# Patient Record
Sex: MALE | Race: WHITE | NOT HISPANIC OR LATINO | Employment: FULL TIME | URBAN - NONMETROPOLITAN AREA
[De-identification: names, ages, dates, MRNs, and addresses within clinical notes are randomized per-mention and may not be internally consistent; named-entity substitution may affect disease eponyms.]

---

## 2024-03-15 ENCOUNTER — HOSPITAL ENCOUNTER (EMERGENCY)
Facility: HOSPITAL | Age: 44
Discharge: HOME | End: 2024-03-15
Attending: EMERGENCY MEDICINE | Admitting: EMERGENCY MEDICINE
Payer: COMMERCIAL

## 2024-03-15 VITALS
WEIGHT: 315 LBS | BODY MASS INDEX: 41.75 KG/M2 | OXYGEN SATURATION: 97 % | TEMPERATURE: 97.9 F | DIASTOLIC BLOOD PRESSURE: 91 MMHG | RESPIRATION RATE: 18 BRPM | SYSTOLIC BLOOD PRESSURE: 134 MMHG | HEIGHT: 73 IN | HEART RATE: 80 BPM

## 2024-03-15 DIAGNOSIS — M54.2 NECK PAIN: Primary | ICD-10-CM

## 2024-03-15 PROCEDURE — 99283 EMERGENCY DEPT VISIT LOW MDM: CPT

## 2024-03-15 PROCEDURE — 99284 EMERGENCY DEPT VISIT MOD MDM: CPT | Performed by: EMERGENCY MEDICINE

## 2024-03-15 RX ORDER — GABAPENTIN 300 MG/1
300 CAPSULE ORAL 3 TIMES DAILY
Qty: 30 CAPSULE | Refills: 0 | Status: SHIPPED | OUTPATIENT
Start: 2024-03-15

## 2024-03-15 NOTE — ED PROVIDER NOTES
"History  Chief Complaint   Patient presents with    Head Injury     Pt c/o shooting pain from neck down spine since hitting head on low cement ceiling while walking up steps 3/2/24. Pt was evaluated at other ED 1wk ago-ct done wnl. Pt requests MRI. Pt has been taking ibuprofen and tylenol with some relief.      Patient is a 43-year-old male presenting to the emergency department complaining of continued neck pain with pain shooting up and down his spine that is been going on after he sustained an injury 13 days ago, states he was walking upstairs and encountered a low concrete ceiling, hitting the top of his head on the ceiling, he was subsequently seen at outside hospital and had CT scan of the head and neck done which showed degenerative changes in his cervical spine, he also saw his PCP and requested an MRI but was told that insurance would not likely approve the MRI, therefore he presents to the emergency department today requesting an MRI, he has been taking Tylenol and Motrin as prescribed at outside hospital but states that he is afraid he is \"masking\" his symptoms by taking this, therefore did not take any today, reports some occasional numbness and tingling down the right arm, denies any having any neck or back problems prior to his injury, he was referred to see comprehensive spine and concussion specialist at outside hospital but first available appointment is April 4, states he took a week off of work in order to rest but symptoms have not yet resolved        None       History reviewed. No pertinent past medical history.    History reviewed. No pertinent surgical history.    History reviewed. No pertinent family history.  I have reviewed and agree with the history as documented.    E-Cigarette/Vaping    E-Cigarette Use Never User      E-Cigarette/Vaping Substances     Social History     Tobacco Use    Smoking status: Never    Smokeless tobacco: Never   Vaping Use    Vaping status: Never Used   Substance " Use Topics    Alcohol use: Yes    Drug use: Never       Review of Systems   Constitutional: Negative.    HENT: Negative.     Eyes: Negative.    Respiratory: Negative.     Cardiovascular: Negative.    Gastrointestinal:  Positive for nausea.   Endocrine: Negative.    Genitourinary: Negative.    Musculoskeletal:  Positive for neck pain.   Skin: Negative.    Allergic/Immunologic: Negative.    Neurological:  Positive for light-headedness and headaches.   Hematological: Negative.    Psychiatric/Behavioral: Negative.         Physical Exam  Physical Exam  Constitutional:       Appearance: He is well-developed.   HENT:      Head: Normocephalic and atraumatic.   Eyes:      Conjunctiva/sclera: Conjunctivae normal.      Pupils: Pupils are equal, round, and reactive to light.   Neck:        Comments: Cervical paraspinal muscle tenderness, no midline tenderness or deformity  Cardiovascular:      Rate and Rhythm: Normal rate.   Pulmonary:      Effort: Pulmonary effort is normal.   Abdominal:      Palpations: Abdomen is soft.   Musculoskeletal:         General: Normal range of motion.        Arms:       Cervical back: Normal range of motion and neck supple.      Comments: Left-sided thoracic paraspinal muscle tenderness, no bony tenderness or deformity   Skin:     General: Skin is warm and dry.   Neurological:      Mental Status: He is alert and oriented to person, place, and time.         Vital Signs  ED Triage Vitals [03/15/24 1633]   Temperature Pulse Respirations Blood Pressure SpO2   97.9 °F (36.6 °C) 80 18 134/91 97 %      Temp src Heart Rate Source Patient Position - Orthostatic VS BP Location FiO2 (%)   -- Monitor Sitting Right arm --      Pain Score       5           Vitals:    03/15/24 1633   BP: 134/91   Pulse: 80   Patient Position - Orthostatic VS: Sitting         Visual Acuity      ED Medications  Medications - No data to display    Diagnostic Studies  Results Reviewed       None                   No orders to display               Procedures  Procedures         ED Course                               SBIRT 22yo+      Flowsheet Row Most Recent Value   Initial Alcohol Screen: US AUDIT-C     1. How often do you have a drink containing alcohol? 0 Filed at: 03/15/2024 1655   2. How many drinks containing alcohol do you have on a typical day you are drinking?  0 Filed at: 03/15/2024 1655   3a. Male UNDER 65: How often do you have five or more drinks on one occasion? 0 Filed at: 03/15/2024 1655   Audit-C Score 0 Filed at: 03/15/2024 1655   MARGARITO: How many times in the past year have you...    Used an illegal drug or used a prescription medication for non-medical reasons? Never Filed at: 03/15/2024 1655                      Medical Decision Making  Patient presents with headache, neck pain, pain shooting up and down his back, lightheadedness and nausea secondary to head injury sustained 13 days prior to visit today.  Outside hospital chart was reviewed, CT head and cervical spine were performed.  Given physical exam findings and history, low suspicion for intracranial hemorrhage or significant trauma, carotid or vertebral artery dissection, significant cervical spine injury, facial fracture or other significant injury.  No focal neurological findings on exam.  No persistent confusion, vomiting, vision changes, difficulty ambulating or intractable pain.  Plan to discharge home with recommendation for follow-up with comprehensive spine and pain management, refrain from strenuous exercise or exertion until symptoms completely resolved, refrain from activities that could result in further head injury.    Problems Addressed:  Neck pain: acute illness or injury    Risk  OTC drugs.  Prescription drug management.             Disposition  Final diagnoses:   Neck pain     Time reflects when diagnosis was documented in both MDM as applicable and the Disposition within this note       Time User Action Codes Description Comment    3/15/2024  4:53 PM  Coni Sanz Add [M54.2] Neck pain           ED Disposition       ED Disposition   Discharge    Condition   Stable    Date/Time   Fri Mar 15, 2024 1654    Comment   Domenic Albert discharge to home/self care.                   Follow-up Information       Follow up With Specialties Details Why Contact Info    Abelardo Francois MD Pain Medicine, Interventional Radiology In 3 days  [address] [address]  [phone]              Discharge Medication List as of 3/15/2024  4:54 PM        START taking these medications    Details   gabapentin (Neurontin) 300 mg capsule Take 1 capsule (300 mg total) by mouth 3 (three) times a day For post-herpetic neuralgia: Take 1 tablet on day 1,  Then take 2 tablets on day 2, Then take 3 tablets on day 3 and every day after that as instructed by your doctor., Starting Fri 3/15/2024,  Normal                 PDMP Review       None            ED Provider  Electronically Signed by             Coni Sanz DO  03/15/24 8870

## 2024-03-15 NOTE — Clinical Note
Domenic Albert was seen and treated in our emergency department on 3/15/2024.                Diagnosis:     Domenic  may return to work on return date.    He may return on this date: 03/19/2024         If you have any questions or concerns, please don't hesitate to call.      Coni Sanz, DO    ______________________________           _______________          _______________  Hospital Representative                              Date                                Time

## 2024-04-03 RX ORDER — LISINOPRIL 10 MG/1
10 TABLET ORAL DAILY
COMMUNITY

## 2024-04-03 RX ORDER — IBUPROFEN 600 MG/1
600 TABLET ORAL EVERY 8 HOURS PRN
COMMUNITY
Start: 2024-03-09 | End: 2025-03-09

## 2024-04-05 ENCOUNTER — CONSULT (OUTPATIENT)
Dept: PAIN MEDICINE | Facility: CLINIC | Age: 44
End: 2024-04-05
Payer: COMMERCIAL

## 2024-04-05 ENCOUNTER — HOSPITAL ENCOUNTER (OUTPATIENT)
Dept: RADIOLOGY | Facility: CLINIC | Age: 44
End: 2024-04-05
Payer: COMMERCIAL

## 2024-04-05 VITALS
HEIGHT: 73 IN | TEMPERATURE: 96.7 F | DIASTOLIC BLOOD PRESSURE: 80 MMHG | BODY MASS INDEX: 41.75 KG/M2 | SYSTOLIC BLOOD PRESSURE: 112 MMHG | OXYGEN SATURATION: 99 % | HEART RATE: 80 BPM | RESPIRATION RATE: 18 BRPM | WEIGHT: 315 LBS

## 2024-04-05 DIAGNOSIS — M54.2 NECK PAIN: ICD-10-CM

## 2024-04-05 DIAGNOSIS — M47.812 CERVICAL SPONDYLOSIS: Primary | ICD-10-CM

## 2024-04-05 DIAGNOSIS — M54.16 LUMBAR RADICULOPATHY: ICD-10-CM

## 2024-04-05 DIAGNOSIS — E11.9 TYPE 2 DIABETES MELLITUS WITHOUT COMPLICATION, WITHOUT LONG-TERM CURRENT USE OF INSULIN (HCC): ICD-10-CM

## 2024-04-05 DIAGNOSIS — M54.12 CERVICAL RADICULOPATHY: ICD-10-CM

## 2024-04-05 PROCEDURE — 99244 OFF/OP CNSLTJ NEW/EST MOD 40: CPT | Performed by: ANESTHESIOLOGY

## 2024-04-05 PROCEDURE — 72100 X-RAY EXAM L-S SPINE 2/3 VWS: CPT

## 2024-04-05 RX ORDER — PSEUDOEPHED/ACETAMINOPH/DIPHEN 30MG-500MG
TABLET ORAL
COMMUNITY
Start: 2024-03-10

## 2024-04-05 RX ORDER — GABAPENTIN 300 MG/1
300 CAPSULE ORAL 3 TIMES DAILY
Qty: 90 CAPSULE | Refills: 2 | Status: SHIPPED | OUTPATIENT
Start: 2024-04-05

## 2024-04-05 RX ORDER — ALBUTEROL SULFATE 90 UG/1
2 AEROSOL, METERED RESPIRATORY (INHALATION) EVERY 4 HOURS PRN
COMMUNITY
Start: 2024-03-11

## 2024-04-05 RX ORDER — CITALOPRAM HYDROBROMIDE 10 MG/1
10 TABLET ORAL EVERY MORNING
COMMUNITY
Start: 2024-03-11

## 2024-04-05 RX ORDER — LEVOTHYROXINE SODIUM 0.15 MG/1
TABLET ORAL
COMMUNITY
Start: 2024-03-24

## 2024-04-05 RX ORDER — CYCLOBENZAPRINE HCL 10 MG
10 TABLET ORAL 3 TIMES DAILY PRN
COMMUNITY
End: 2024-04-05

## 2024-04-05 RX ORDER — HYDROCORTISONE 25 MG/G
CREAM TOPICAL
COMMUNITY
Start: 2024-02-08

## 2024-04-05 NOTE — PROGRESS NOTES
Assessment  1. Cervical spondylosis  -     Ambulatory referral to Physical Therapy; Future    2. Neck pain - Bilateral  -     Ambulatory referral to Spine & Pain Management  -     Ambulatory referral to Physical Therapy; Future    3. Cervical radiculopathy    4. Type 2 diabetes mellitus without complication, without long-term current use of insulin (HCC)    Right, greater than left sided cervical radicular pain in C7 dermatomal distribution accompanied by pain limited weakness numbness and paresthesias.  Patient has been a full participant with PT. Chronic pain with decreased participation with IADLs over the past few years.  Has been taking NSAIDs and tramadol infrequently with modest benefit.  5/5 strength bilaterally in upper extremities with AROM, negative spurling's maneuver,b/l.  Additionally there is positive cervical facet loading eliciting pain, left greater than right. Negative Ballard's, denies any gait instability, saddle anesthesia. On imaging Scattered multilevel spondylotic changes most pronounced at C3-C4 acentrically on the left.  No cord compression or cord signal abnormality.  Risks, benefits alternatives to epidural steroid injections thoroughly discussed with patient.  Handouts provided questions answered to patient's satisfaction.  Lifestyle modifications extensively discussed including sleep hygiene, neck posture, diet, exercise and weight loss in conjunction with PT.  Will proceed with multimodal pain therapy plan as noted below:      Plan  -C7-T1 ILESI; f/u 2 weeks post procedure (patient may call back to schedule procedure)  -gabapentin 300 mg t.i.d. Ordered for patient; counseled regarding sedative effects of taking this medication and provided up titration calendar.  Counseled not to take medication while driving or operating heavy machinery/using stairs  -ibuprofen 600 mg t.i.d. prn pain previously prescribed.  Patient educated regarding bleeding risk of taking this medication as  well as not taking any other nonsteroidal anti-inflammatory medications while taking this medication; counseled thoroughly regarding potential risk of Cardiovascular injury, Kidney injury, Gastrointestinal ulceration/bleeding. Patient voiced understanding  -script provided for formal physical therapy for right-sided cervical and lumbar radiculopathy; Physician directed home exercise plan as per [facility] demonstrated and handouts provided that patient plans to participate with for 1 hour, twice a week for the next 6 weeks.     There are risks associated with opioid medications, including dependence, addiction and tolerance. The patient understands and agrees to use these medications only as prescribed. Potential side effects of the medications include, but are not limited to, constipation, drowsiness, addiction, impaired judgment and risk of fatal overdose if not taken as prescribed. The patient was warned against driving while taking sedation medications or operating heavy machinery. The patient voiced understanding. Sharing medications is a felony. At this point in time, the patient is showing no signs of addiction, abuse, diversion or suicidal ideation.     [place] Prescription Drug Monitoring Program report was reviewed and was appropriate      Complete risks and benefits including bleeding, infection, tissue reaction, nerve injury and allergic reaction were discussed. The approach was demonstrated using models and literature was provided. Verbal and written consent was obtained.     My impressions and treatment recommendations were discussed in detail with the patient who verbalized understanding and had no further questions.  Discharge instructions were provided. I personally saw and examined the patient and I agree with the above discussed plan of care.    New Medications Ordered This Visit   Medications    ibuprofen (MOTRIN) 600 mg tablet     Sig: Take 600 mg by mouth every 8 (eight) hours as needed     lisinopril (ZESTRIL) 10 mg tablet     Sig: Take 10 mg by mouth daily    Acetaminophen Extra Strength 500 MG TABS     Sig: take 1 tablet by mouth every 6 hours if needed for MILD PAIN ( PAIN SCALE 1-3 )    albuterol (PROVENTIL HFA,VENTOLIN HFA) 90 mcg/act inhaler     Sig: Inhale 2 puffs every 4 (four) hours as needed    citalopram (CeleXA) 10 mg tablet     Sig: Take 10 mg by mouth every morning    cyclobenzaprine (FLEXERIL) 10 mg tablet     Sig: Take 10 mg by mouth Three times daily as needed    Procto-Med HC 2.5 % rectal cream     Sig: APPLY INTERNALLY DAILY FOR 1 WEEK    levothyroxine 150 mcg tablet     Sig: TAKE 1 TABLET BY MOUTH ONCE DAILY BEFORE A MEAL EVERY MORNING-TAKE ON EMPTY STOMACH-DRINK PLENTY OF WATER.    metFORMIN (GLUCOPHAGE) 1000 MG tablet     Sig: Take 1,000 mg by mouth 2 (two) times a day       History of Present Illness    Domenic Albert is a 43 y.o. male with pmhx of obesity, HTN, DM-2, presenting with with a past medical history of right greater htan left-sided neck pain described primarily as radicular nature.  The pain radiates in the C5 and C6 dermatomal distributions and is primarily right more so than left-sided.  The pain contributes to significant disability in participation with independent activities of daily living and is accompanied by weakness numbness and paresthesias that are debilitating in nature.  The patient describes that overhead maneuvers such as combing hair is significantly limiting with respect to strength in the right more so than left arm/hand. The patient notes significant weakness with right over left hand  as well. The patient has not been to physical therapy but trialed chiropractic therapy with modest relief.  He has trialed conservative measures including nsaids tylenol and gabapentin for the pain but has not trialed any steroids.  He has never had interventional pain procedures in the past including any cervical interlaminar epidural steroid injections in  the past for this pain. Denies any gait abnormality, saddle anesthesia or bowel/bladder abnormality.    Subacute to chronic lumbar radicular pain in the right L5 and S1 dermatomal distributions. Debilitating pain limited weakness numbness and paresthesias accompany the pain. The patient rates the pain at a 8/10 accompanied by electric shock-like shooting features and crampy burning pain in the aforementioned dermatomal distributions.  The pain is worse in the mornings as well as the end of the day; exertion such as walking for long periods of time seems to exacerbate the pain.  He tries to maintain an active lifestyle and finds that the current degree of pain seems to compromises his efforts.  The pain significantly impacts independent activities of daily living and contributes to significant disability.  He has attempted chiropractic but not physical therapy with exacerbation of the pain.  He has taken nsaids, tylenol as well as gabapentin with limited relief of the pain as well.  He has never tried epidural steroid injections in the past. He denies any bowel or bladder dysfunction/incontinence, saddle anesthesia but endorses gait instability.    I have personally reviewed and/or updated the patient's past medical history, past surgical history, family history, social history, current medications, allergies, and vital signs today.     Review of Systems   Constitutional:  Positive for activity change.   HENT: Negative.     Eyes: Negative.    Respiratory: Negative.     Cardiovascular: Negative.    Gastrointestinal: Negative.    Endocrine: Negative.    Genitourinary: Negative.    Musculoskeletal:  Positive for arthralgias, back pain, gait problem, myalgias, neck pain and neck stiffness.   Skin: Negative.    Allergic/Immunologic: Negative.    Neurological:  Positive for weakness and numbness.   Hematological: Negative.    Psychiatric/Behavioral: Negative.     All other systems reviewed and are negative.      Patient  Active Problem List   Diagnosis    Cervical radiculopathy    Cervical spondylosis    Type 2 diabetes mellitus without complication, without long-term current use of insulin (HCC)       Past Medical History:   Diagnosis Date    Hypertension        Past Surgical History:   Procedure Laterality Date    APPENDECTOMY  2008    NASAL SEPTUM SURGERY  2008    TOENAIL EXCISION  2000    TONSILLECTOMY  1994       History reviewed. No pertinent family history.    Social History     Occupational History    Not on file   Tobacco Use    Smoking status: Never    Smokeless tobacco: Never   Vaping Use    Vaping status: Never Used   Substance and Sexual Activity    Alcohol use: Yes    Drug use: Never    Sexual activity: Not on file       Current Outpatient Medications on File Prior to Visit   Medication Sig    Acetaminophen Extra Strength 500 MG TABS take 1 tablet by mouth every 6 hours if needed for MILD PAIN ( PAIN SCALE 1-3 )    albuterol (PROVENTIL HFA,VENTOLIN HFA) 90 mcg/act inhaler Inhale 2 puffs every 4 (four) hours as needed    citalopram (CeleXA) 10 mg tablet Take 10 mg by mouth every morning    cyclobenzaprine (FLEXERIL) 10 mg tablet Take 10 mg by mouth Three times daily as needed    gabapentin (Neurontin) 300 mg capsule Take 1 capsule (300 mg total) by mouth 3 (three) times a day For post-herpetic neuralgia: Take 1 tablet on day 1,  Then take 2 tablets on day 2, Then take 3 tablets on day 3 and every day after that as instructed by your doctor.    levothyroxine 150 mcg tablet TAKE 1 TABLET BY MOUTH ONCE DAILY BEFORE A MEAL EVERY MORNING-TAKE ON EMPTY STOMACH-DRINK PLENTY OF WATER.    lisinopril (ZESTRIL) 10 mg tablet Take 10 mg by mouth daily    metFORMIN (GLUCOPHAGE) 1000 MG tablet Take 1,000 mg by mouth 2 (two) times a day    Procto-Med HC 2.5 % rectal cream APPLY INTERNALLY DAILY FOR 1 WEEK    ibuprofen (MOTRIN) 600 mg tablet Take 600 mg by mouth every 8 (eight) hours as needed (Patient not taking: Reported on 4/5/2024)  "    No current facility-administered medications on file prior to visit.       Allergies   Allergen Reactions    Epoetin (Mando) Other (See Comments)     Other reaction(s): Sweats and fever    Levofloxacin Other (See Comments)         Physical Exam    /80 (BP Location: Left arm, Patient Position: Sitting, Cuff Size: Large)   Pulse 80   Temp (!) 96.7 °F (35.9 °C)   Resp 18   Ht 6' 1\" (1.854 m)   Wt (!) 152 kg (335 lb)   SpO2 99%   BMI 44.20 kg/m²     Constitutional: normal, well developed, well nourished, alert, in no distress and non-toxic and no overt pain behavior. and obese  Eyes: anicteric  HEENT: grossly intact  Neck: supple, symmetric, trachea midline and no masses   Pulmonary:even and unlabored  Cardiovascular:No edema or pitting edema present  Skin:Normal without rashes or lesions and well hydrated  Psychiatric:Mood and affect appropriate  Neurologic:Cranial Nerves II-XII grossly intact Sensation grossly intact; no clonus negative rojas's. Reflexes 2+ and brisk. SLR negative bilaterally. Spurling's maneuver negative bilaterally.  Musculoskeletal:normal gait. 5/5 strength bilaterally with AROM in all extremities. Normal heel toe and tip toe walking. Significant pain with cervical and lumbar facet loading bilaterally and with lateral spine rotation, ttp over cervical and lumbar paraspinal muscles, right greater than left. Negative estelle's test, negative gaenslen's negative SIJ loading bilaterally.    Imaging    CT Cervical Spine Without Contrast   Exam date and time: 3/9/2024 6:16 PM   Age: 43 years old   Clinical indication: Injury or trauma; Blunt trauma; Patient HX: Hit head x 1   wk. Headache     TECHNIQUE:   Imaging protocol: Computed tomography of the cervical spine without contrast.   Radiation optimization: All CT scans at this facility use at least one of these   dose optimization techniques: automated exposure control; mA and/or kV   adjustment per patient size (includes targeted exams " where dose is matched to   clinical indication); or iterative reconstruction.     COMPARISON:   CT NECK SOFT TISSUE W CONTRAST 10/12/2018 9:22 AM     FINDINGS:   Bones/joints: No acute fracture. Normal alignment.     Degenerative change is identified in the spine.  There is disc space narrowing   and osteophyte formation especially at C4/5.

## 2024-04-05 NOTE — PATIENT INSTRUCTIONS
Neck Exercises   AMBULATORY CARE:   Neck exercises  help reduce neck pain, and improve neck movement and strength. Neck exercises also help prevent long-term neck problems.  Call your doctor if:   Your pain does not get better, or gets worse.    You have questions or concerns about your condition, care, or exercise program.    What you need to know about exercise safety:   Move slowly, gently, and smoothly.  Avoid fast or jerky motions.    Stand and sit the way your healthcare provider shows you.  Good posture may reduce your neck pain. Check your posture often, even when you are not doing your neck exercises.    Follow the exercise program recommended by your healthcare provider.  He or she will tell you which exercises are best for your condition. He or she will also tell you how many repetitions to do and how often you should do the exercises.    How to perform neck exercises safely:   Exercise position:  You may sit or stand while you do neck exercises. Face forward. Your shoulders should be straight and relaxed, with a good posture.         Head tilts, forward and back:  Gently bow your head and try to touch your chin to your chest. Your healthcare provider may tell you to push on the back of your neck to help bow your head. Raise your chin back to the starting position. Tilt your head back as far as possible so you are looking up at the ceiling. Your healthcare provider may tell you to lift your chin to help tilt your head back. Return your head to the starting position.         Head tilts, side to side:  Tilt your head, bringing your ear toward your shoulder. Then tilt your head toward the other shoulder.         Head turns:  Turn your head to look over your shoulder. Tilt your chin down and try to touch it to your shoulder. Do not raise your shoulder to your chin. Face forward again. Do the same on the other side.         Head rolls:  Slowly bring your chin toward your chest. Next, roll your head to the  right. Your ear should be positioned over your shoulder. Hold this position for 5 seconds. Roll your head back toward your chest and to the left into the same position. Hold for 5 seconds. Gently roll your head back and around in a clockwise [place] 3 times. Next, move your head in the reverse direction (counterclockwise) in a [place] 3 times. Do not shrug your shoulders upwards while you do this exercise.       Follow up with your doctor as directed:  Write down your questions so you remember to ask them during your visits.  © Copyright [employer] 2023 Information is for End User's use only and may not be sold, redistributed or otherwise used for commercial purposes.  The above information is an  only. It is not intended as medical advice for individual conditions or treatments. Talk to your doctor, nurse or pharmacist before following any medical regimen to see if it is safe and effective for you.  Core Strengthening Exercises   WHAT YOU NEED TO KNOW:   Your core includes the muscles of your lower back, hip, pelvis, and abdomen. Core strengthening exercises help heal and strengthen these muscles. This helps prevent another injury, and keeps your pelvis, spine, and hips in the correct position.  DISCHARGE INSTRUCTIONS:   Call your doctor or physical therapist if:   You have sharp or worsening pain during exercise or at rest.    You have questions or concerns about your shoulder exercises.    Safety tips:  Talk to your healthcare provider before you start an exercise program. A physical therapist can teach you how to do core strengthening exercises safely.  Do the exercises on a mat or firm surface.  A firm surface will support your spine and prevent low back pain. Do not do these exercises on a bed.    Move slowly and smoothly.  Avoid fast or jerky motions.    Stop if you feel pain.  You may feel some discomfort at first, but you should not feel pain. Tell your provider or physical therapist if you have  pain while you exercise. Regular exercise will help decrease your discomfort over time.    Breathe normally during core exercises.  Do not hold your breath. This may cause an increase in blood pressure and prevent muscle strengthening. Your healthcare provider will tell you when to inhale and exhale during the exercise.    Begin all of your exercises with abdominal bracing.  Abdominal bracing helps warm up your core muscles. You can also practice abdominal bracing throughout the day. Lie on your back with your knees bent and feet flat on the floor. Place your arms in a relaxed position beside your body. Tighten your abdominal muscles. Pull your belly button in and up toward your spine. Hold for 5 seconds. Relax your muscles. Repeat 10 times.       Core strengthening exercises:  Your healthcare provider will tell you how often to do these exercises. The provider will also tell you how many repetitions of each exercise you should do. Hold each exercise for 5 seconds or as directed. As you get stronger, increase your hold to 10 to 15 seconds. You can do some of these exercises on a stability ball, or with a weight. Ask your healthcare provider how to use a stability ball or weight for these exercises:  Bridging:  Lie on your back with your knees bent and feet flat on the floor. Rest your arms at your side. Tighten your buttocks, and then lift your hips 1 inch off the floor. Hold for 5 seconds. When you can do this exercise without pain for 10 seconds, increase the distance you lift your hips. A good goal is to be able to lift your hips so that your shoulders, hips, and knees are in a straight line.         Dead bug:  Lie on your back with your knees bent and feet flat on the floor. Place your arms in a relaxed position beside your body. Begin with abdominal bracing. Next, raise one leg, keeping your knee bent. Hold for 5 seconds. Repeat with the other leg. When you can do this exercise without pain for 10 to 15  seconds, you may raise one straight leg and hold. Repeat with the other leg.         Quadruped:  Place your hands and knees on the floor. Keep your wrists directly below your shoulders and your knees directly below your hips. Pull your belly button in toward your spine. Do not flatten or arch your back. Tighten your abdominal muscles below your belly button. Hold for 5 seconds. When you can do this exercise without pain for 10 to 15 seconds, you may extend one arm and hold. Repeat on the other side.         Side bridge exercises:      Standing side bridge:  Stand next to a wall and extend one arm toward the wall. Place your palm flat on the wall with your fingers pointing upward. Begin with abdominal bracing. Next, without moving your feet, slowly bend your arm to 90 degrees. Hold for 5 seconds. Repeat on the other side. When you can do this exercise without pain for 10 to 15 seconds, you may do the bent leg side bridge on the floor.         Bent leg side bridge:  Lie on one side with your legs, hips, and shoulders in a straight line. Prop yourself up onto your forearm so your elbow is directly below your shoulder. Bend your knees back to 90 degrees. Begin with abdominal bracing. Next, lift your hips and balance yourself on your forearm and knees. Hold for 5 seconds. Repeat on the other side. When you can do this exercise without pain for 10 to 15 seconds, you may do the straight leg side bridge on the floor.         Straight leg side bridge:  Lie on one side with your legs, hips, and shoulders in a straight line. Prop yourself up onto your forearm so your elbow is directly below your shoulder. Begin with abdominal bracing. Lift your hips off the floor and balance yourself on your forearm and the outside of your flexed foot. Do not let your ankle bend sideways. Hold for 5 seconds. Repeat on the other side. When you can do this exercise without pain for 10 to 15 seconds, ask your healthcare provider for more advanced  exercises.       Superman:  Lie on your stomach. Extend your arms forward on the floor. Tighten your abdominal muscles and lift your right hand and left leg off the floor. Hold this position. Slowly return to the starting position. Tighten your abdominal muscles and lift your left hand and right leg off the floor. Hold this position. Slowly return to the starting position.         Clam:  Lie on your side with your knees bent. Put your bottom arm under your head to keep your neck in line. Put your top hand on your hip to keep your pelvis from moving. Put your heels together, and keep them together during this exercise. Slowly raise your top knee toward the ceiling. Then lower your leg so your knees are together. Repeat this exercise 10 times. Then switch sides and do the exercise 10 times with the other leg.         Curl up:  Lie on your back with your knees bent and feet flat on the floor. Place your hands, palms down, underneath your lower back. Next, with your elbows on the floor, lift your shoulders and chest 2 to 3 inches off the floor. Keep your head in line with your shoulders. Hold this position. Slowly return to the starting position.         Straight leg raises:  Lie on your back with one leg straight. Bend the other knee and place your foot flat on the floor. Tighten your abdominal muscles. Keep your leg straight and slowly lift it straight up 6 to 12 inches off the floor. Hold this position. Lower your leg slowly. Do as many repetitions as directed on this side. Repeat with the other leg.         Plank:  Lie on your stomach. Bend your elbows and place your forearms flat on the floor. Lift your chest, stomach, and knees off the floor. Make sure your elbows are below your shoulders. Your body should be in a straight line. Do not let your hips or lower back sink to the ground. Squeeze your abdominal muscles together and hold for 15 seconds. To make this exercise harder, hold for 30 seconds or lift 1 leg at a  time.         Bicycles:  Lie on your back. Bend both knees and bring them toward your chest. Your calves should be parallel to the floor. Place the palms of your hands on the back of your head. Straighten your right leg and keep it lifted 2 inches off the floor. Raise your head and shoulders off the floor and twist towards your left. Keep your head and shoulders lifted. Bend your right knee while you straighten your left leg. Keep your left leg 2 inches off the floor. Twist your head and chest towards the left leg. Continue to straighten 1 leg at a time and twist.       Follow up with your doctor or physical therapist as directed:  Write down your questions so you remember to ask them during your visits.  © Copyright [employer] 2023 Information is for End User's use only and may not be sold, redistributed or otherwise used for commercial purposes.  The above information is an  only. It is not intended as medical advice for individual conditions or treatments. Talk to your doctor, nurse or pharmacist before following any medical regimen to see if it is safe and effective for you.

## 2024-04-08 ENCOUNTER — TELEPHONE (OUTPATIENT)
Dept: PAIN MEDICINE | Facility: MEDICAL CENTER | Age: 44
End: 2024-04-08

## 2024-04-08 NOTE — TELEPHONE ENCOUNTER
----- Message from Abelardo Francois MD sent at 4/8/2024 12:31 PM EDT -----  There is mild facet disease lower lumbar spine. Otherwise no significant lumbar degenerative change noted on xray; Please relay above xray findings to patient; should f/u after completion of PT to guide interventional treatment options, thanks    ----- Message -----  From: Interface, Radiology Results In  Sent: 4/5/2024   8:34 PM EDT  To: Abelardo Francois MD

## 2024-04-08 NOTE — TELEPHONE ENCOUNTER
Caller: Domenic     Doctor: Priscila     Reason for call: Pt is returning RN and asked that the nurse give him a detailed message if possible.     Call back#: [phone]

## 2024-04-08 NOTE — TELEPHONE ENCOUNTER
Caller: sukh Sheth    Doctor: Priscila    Reason for call: pt returning the nurse's call    Call back#: [phone]

## 2024-04-09 NOTE — TELEPHONE ENCOUNTER
Caller: Domenic     Doctor: Priscila    Reason for call: Patient returning call from Nurse if unable to reach patient please leave detail message please advise     Call back#: [phone]

## 2024-04-12 ENCOUNTER — TELEPHONE (OUTPATIENT)
Dept: OTHER | Facility: OTHER | Age: 44
End: 2024-04-12

## 2024-04-12 NOTE — TELEPHONE ENCOUNTER
Pt would like to go ahead and schedule an appointment for the epidural shot Dr. Francois recommended for pt     Please give him a call back

## 2024-04-16 ENCOUNTER — TELEPHONE (OUTPATIENT)
Dept: PAIN MEDICINE | Facility: CLINIC | Age: 44
End: 2024-04-16

## 2024-04-16 ENCOUNTER — PREP FOR PROCEDURE (OUTPATIENT)
Dept: PAIN MEDICINE | Facility: CLINIC | Age: 44
End: 2024-04-16

## 2024-04-16 DIAGNOSIS — M54.12 CERVICAL RADICULOPATHY: Primary | ICD-10-CM

## 2024-04-16 NOTE — TELEPHONE ENCOUNTER
Spoke to patient and he is scheduled for 4/23/24. Patient aware of instructions. No food/drink one hour prior. Wear comfortable clothing. A  is required. Denies blood thinners. Continue all other prescribed medications on day of procedure. Call if prescribed an antibiotic or become sick. Refrain from vaccinations two weeks prior and two weeks after. Patient aware [facility] nurse will call day prior with instructions and report time. Call with questions.

## 2024-04-23 ENCOUNTER — HOSPITAL ENCOUNTER (OUTPATIENT)
Dept: INTERVENTIONAL RADIOLOGY/VASCULAR | Facility: HOSPITAL | Age: 44
Discharge: HOME | End: 2024-04-23
Attending: ANESTHESIOLOGY
Payer: COMMERCIAL

## 2024-04-23 VITALS
OXYGEN SATURATION: 98 % | WEIGHT: 315 LBS | TEMPERATURE: 98.5 F | BODY MASS INDEX: 41.75 KG/M2 | SYSTOLIC BLOOD PRESSURE: 132 MMHG | HEIGHT: 73 IN | HEART RATE: 70 BPM | DIASTOLIC BLOOD PRESSURE: 86 MMHG | RESPIRATION RATE: 20 BRPM

## 2024-04-23 DIAGNOSIS — M54.12 CERVICAL RADICULOPATHY: ICD-10-CM

## 2024-04-23 LAB — GLUCOSE SERPL-MCNC: 152 MG/DL (ref 65–140)

## 2024-04-23 PROCEDURE — 82948 REAGENT STRIP/BLOOD GLUCOSE: CPT

## 2024-04-23 PROCEDURE — 62321 NJX INTERLAMINAR CRV/THRC: CPT | Performed by: ANESTHESIOLOGY

## 2024-04-23 RX ORDER — 0.9 % SODIUM CHLORIDE 0.9 %
VIAL (ML) INJECTION AS NEEDED
Status: COMPLETED | OUTPATIENT
Start: 2024-04-23 | End: 2024-04-23

## 2024-04-23 RX ORDER — METHYLPREDNISOLONE ACETATE 80 MG/ML
INJECTION, SUSPENSION INTRA-ARTICULAR; INTRALESIONAL; INTRAMUSCULAR; SOFT TISSUE AS NEEDED
Status: COMPLETED | OUTPATIENT
Start: 2024-04-23 | End: 2024-04-23

## 2024-04-23 RX ORDER — LIDOCAINE HYDROCHLORIDE 10 MG/ML
INJECTION, SOLUTION EPIDURAL; INFILTRATION; INTRACAUDAL; PERINEURAL AS NEEDED
Status: COMPLETED | OUTPATIENT
Start: 2024-04-23 | End: 2024-04-23

## 2024-04-23 RX ADMIN — METHYLPREDNISOLONE ACETATE 80 MG: 80 INJECTION, SUSPENSION INTRA-ARTICULAR; INTRALESIONAL; INTRAMUSCULAR; SOFT TISSUE at 11:30

## 2024-04-23 RX ADMIN — IOHEXOL 1 ML: 240 INJECTION, SOLUTION INTRATHECAL; INTRAVASCULAR; INTRAVENOUS; ORAL at 11:30

## 2024-04-23 RX ADMIN — SODIUM CHLORIDE 3 ML: 9 INJECTION, SOLUTION INTRAMUSCULAR; INTRAVENOUS; SUBCUTANEOUS at 11:30

## 2024-04-23 RX ADMIN — LIDOCAINE HYDROCHLORIDE 5 ML: 10 INJECTION, SOLUTION EPIDURAL; INFILTRATION; INTRACAUDAL; PERINEURAL at 11:30

## 2024-04-23 NOTE — INTERVAL H&P NOTE
H&P reviewed. After examining the patient I find no changes in the patients condition since the H&P had been written.    Vitals:    04/23/24 1102   BP: 133/95   Pulse: 74   Resp: 16   Temp: (!) 97.4 °F (36.3 °C)   SpO2: 98%

## 2024-04-23 NOTE — DISCHARGE INSTR - AVS FIRST PAGE
YOUR 2 WEEK FOLLOW UP HAS BEEN SCHEDULED; IF YOU WISH TO CHANGE THE FOLLOW UP, PLEASE CALL THE [facility] AT [place]: [phone]    Epidural Steroid Injection   WHAT YOU NEED TO KNOW:   An epidural steroid injection (VAUGHN) is a procedure to inject steroid medicine into the epidural space. The epidural space is between your spinal cord and vertebrae. Steroids reduce inflammation and fluid buildup in your spine that may be causing pain. You may be given pain medicine along with the steroids.        DISCHARGE INSTRUCTIONS:   Call your local emergency number (911 in the [place]) if:   You have a seizure.    You have trouble moving your legs.    Seek care immediately if:   Blood soaks through your bandage.    You have a fever or chills, severe back pain, and the procedure area is sensitive to the touch.    You cannot control when you urinate or have a bowel movement.    Call your doctor if:   You have weakness or numbness in your legs.    Your wound is red, swollen, or draining pus.    You have nausea or are vomiting.    Your face or neck is red and you feel warm.    You have more pain than you had before the procedure.    You have swelling in your hands or feet.    You have questions or concerns about your condition or care.    Care for your wound as directed:  You may remove the bandage before you go to bed the day of your procedure. You may take a shower, but do not take a bath for at least 24 hours.   Self-care:   Do not drive,  use machines, or do strenuous activity for 24 hours after your procedure or as directed.     Continue other treatments  as directed. Steroid injections alone will not control your pain. The injections are meant to be used with other treatments, such as physical therapy.    Follow up with your doctor as directed:  Write down your questions so you remember to ask them during your visits.     EPIDURAL STEROID INJECTION DISCHARGE INSTRUCTIONS      ACTIVITY  Do not drive or operate  machinery today.  No strenuous activity today - bending, lifting, etc.   You may resume normal activities starting tomorrow - start slowly and as tolerated.  You may shower today, but not tub baths or hot tubs.  You may have numbness for several hours from the local anesthetics. Please use caution and common sense, especially with weight-bearing activities.    CARE OF THE INJECTION SITE  If you have soreness or pain apply ice to the area today (20 minutes on and 20 minutes off).  Starting tomorrow, you   Notify the [facility] if you have any of the following: redness, drainage, swelling or fever above 100°F.      MEDICATIONS  Continue to take all routine medications.  Our office may have instructed you to hold some medications. You may restart medications, including blood thinners.

## 2024-04-30 ENCOUNTER — TELEPHONE (OUTPATIENT)
Dept: OBGYN CLINIC | Facility: HOSPITAL | Age: 44
End: 2024-04-30

## 2024-05-02 ENCOUNTER — TELEPHONE (OUTPATIENT)
Age: 44
End: 2024-05-02

## 2024-05-02 NOTE — TELEPHONE ENCOUNTER
Patient called asking to speak to [facility].  Provided number and warm transfer to [facility].

## 2024-05-02 NOTE — TELEPHONE ENCOUNTER
Pain level 6  Improvement 10% patient is still in pain, still taking tylenol and the gabapentin and not helping with pain.     No

## 2024-05-02 NOTE — TELEPHONE ENCOUNTER
1 wk post inj update:    Pt also on Enrique 300mg TID.     Pls advise any further recs. Thank you.

## 2024-05-13 ENCOUNTER — TELEPHONE (OUTPATIENT)
Age: 44
End: 2024-05-13

## 2024-05-13 NOTE — TELEPHONE ENCOUNTER
Caller: patient    Doctor: kilo    Reason for call: will be faxing over Disability form    Call back#:

## 2024-05-17 ENCOUNTER — OFFICE VISIT (OUTPATIENT)
Dept: PAIN MEDICINE | Facility: CLINIC | Age: 44
End: 2024-05-17
Payer: COMMERCIAL

## 2024-05-17 VITALS
RESPIRATION RATE: 18 BRPM | HEART RATE: 78 BPM | SYSTOLIC BLOOD PRESSURE: 124 MMHG | WEIGHT: 315 LBS | TEMPERATURE: 97.3 F | HEIGHT: 73 IN | DIASTOLIC BLOOD PRESSURE: 84 MMHG | BODY MASS INDEX: 41.75 KG/M2 | OXYGEN SATURATION: 100 %

## 2024-05-17 DIAGNOSIS — F41.9 ANXIETY: ICD-10-CM

## 2024-05-17 DIAGNOSIS — M54.12 CERVICAL RADICULOPATHY: Primary | ICD-10-CM

## 2024-05-17 DIAGNOSIS — M47.812 CERVICAL SPONDYLOSIS: ICD-10-CM

## 2024-05-17 DIAGNOSIS — M54.16 LUMBAR RADICULOPATHY: ICD-10-CM

## 2024-05-17 PROCEDURE — 99214 OFFICE O/P EST MOD 30 MIN: CPT | Performed by: ANESTHESIOLOGY

## 2024-05-17 RX ORDER — ALPRAZOLAM 0.5 MG/1
0.5 TABLET ORAL
Qty: 2 TABLET | Refills: 0 | Status: SHIPPED | OUTPATIENT
Start: 2024-05-17

## 2024-05-17 RX ORDER — GABAPENTIN 600 MG/1
600 TABLET ORAL 3 TIMES DAILY
Qty: 90 TABLET | Refills: 2 | Status: SHIPPED | OUTPATIENT
Start: 2024-05-17

## 2024-05-17 RX ORDER — IBUPROFEN 200 MG
TABLET ORAL EVERY 6 HOURS PRN
COMMUNITY

## 2024-05-17 RX ORDER — ACETAMINOPHEN 325 MG/1
650 TABLET ORAL EVERY 6 HOURS PRN
COMMUNITY

## 2024-05-17 RX ORDER — COVID-19 ANTIGEN TEST
KIT MISCELLANEOUS
COMMUNITY

## 2024-05-17 NOTE — PATIENT INSTRUCTIONS
Core Strengthening Exercises   WHAT YOU NEED TO KNOW:   Your core includes the muscles of your lower back, hip, pelvis, and abdomen. Core strengthening exercises help heal and strengthen these muscles. This helps prevent another injury, and keeps your pelvis, spine, and hips in the correct position.  DISCHARGE INSTRUCTIONS:   Call your doctor or physical therapist if:   You have sharp or worsening pain during exercise or at rest.    You have questions or concerns about your shoulder exercises.    Safety tips:  Talk to your healthcare provider before you start an exercise program. A physical therapist can teach you how to do core strengthening exercises safely.  Do the exercises on a mat or firm surface.  A firm surface will support your spine and prevent low back pain. Do not do these exercises on a bed.    Move slowly and smoothly.  Avoid fast or jerky motions.    Stop if you feel pain.  You may feel some discomfort at first, but you should not feel pain. Tell your provider or physical therapist if you have pain while you exercise. Regular exercise will help decrease your discomfort over time.    Breathe normally during core exercises.  Do not hold your breath. This may cause an increase in blood pressure and prevent muscle strengthening. Your healthcare provider will tell you when to inhale and exhale during the exercise.    Begin all of your exercises with abdominal bracing.  Abdominal bracing helps warm up your core muscles. You can also practice abdominal bracing throughout the day. Lie on your back with your knees bent and feet flat on the floor. Place your arms in a relaxed position beside your body. Tighten your abdominal muscles. Pull your belly button in and up toward your spine. Hold for 5 seconds. Relax your muscles. Repeat 10 times.       Core strengthening exercises:  Your healthcare provider will tell you how often to do these exercises. The provider will also tell you how many repetitions of each  exercise you should do. Hold each exercise for 5 seconds or as directed. As you get stronger, increase your hold to 10 to 15 seconds. You can do some of these exercises on a stability ball, or with a weight. Ask your healthcare provider how to use a stability ball or weight for these exercises:  Bridging:  Lie on your back with your knees bent and feet flat on the floor. Rest your arms at your side. Tighten your buttocks, and then lift your hips 1 inch off the floor. Hold for 5 seconds. When you can do this exercise without pain for 10 seconds, increase the distance you lift your hips. A good goal is to be able to lift your hips so that your shoulders, hips, and knees are in a straight line.         Dead bug:  Lie on your back with your knees bent and feet flat on the floor. Place your arms in a relaxed position beside your body. Begin with abdominal bracing. Next, raise one leg, keeping your knee bent. Hold for 5 seconds. Repeat with the other leg. When you can do this exercise without pain for 10 to 15 seconds, you may raise one straight leg and hold. Repeat with the other leg.         Quadruped:  Place your hands and knees on the floor. Keep your wrists directly below your shoulders and your knees directly below your hips. Pull your belly button in toward your spine. Do not flatten or arch your back. Tighten your abdominal muscles below your belly button. Hold for 5 seconds. When you can do this exercise without pain for 10 to 15 seconds, you may extend one arm and hold. Repeat on the other side.         Side bridge exercises:      Standing side bridge:  Stand next to a wall and extend one arm toward the wall. Place your palm flat on the wall with your fingers pointing upward. Begin with abdominal bracing. Next, without moving your feet, slowly bend your arm to 90 degrees. Hold for 5 seconds. Repeat on the other side. When you can do this exercise without pain for 10 to 15 seconds, you may do the bent leg side  bridge on the floor.         Bent leg side bridge:  Lie on one side with your legs, hips, and shoulders in a straight line. Prop yourself up onto your forearm so your elbow is directly below your shoulder. Bend your knees back to 90 degrees. Begin with abdominal bracing. Next, lift your hips and balance yourself on your forearm and knees. Hold for 5 seconds. Repeat on the other side. When you can do this exercise without pain for 10 to 15 seconds, you may do the straight leg side bridge on the floor.         Straight leg side bridge:  Lie on one side with your legs, hips, and shoulders in a straight line. Prop yourself up onto your forearm so your elbow is directly below your shoulder. Begin with abdominal bracing. Lift your hips off the floor and balance yourself on your forearm and the outside of your flexed foot. Do not let your ankle bend sideways. Hold for 5 seconds. Repeat on the other side. When you can do this exercise without pain for 10 to 15 seconds, ask your healthcare provider for more advanced exercises.       Superman:  Lie on your stomach. Extend your arms forward on the floor. Tighten your abdominal muscles and lift your right hand and left leg off the floor. Hold this position. Slowly return to the starting position. Tighten your abdominal muscles and lift your left hand and right leg off the floor. Hold this position. Slowly return to the starting position.         Clam:  Lie on your side with your knees bent. Put your bottom arm under your head to keep your neck in line. Put your top hand on your hip to keep your pelvis from moving. Put your heels together, and keep them together during this exercise. Slowly raise your top knee toward the ceiling. Then lower your leg so your knees are together. Repeat this exercise 10 times. Then switch sides and do the exercise 10 times with the other leg.         Curl up:  Lie on your back with your knees bent and feet flat on the floor. Place your hands, palms  down, underneath your lower back. Next, with your elbows on the floor, lift your shoulders and chest 2 to 3 inches off the floor. Keep your head in line with your shoulders. Hold this position. Slowly return to the starting position.         Straight leg raises:  Lie on your back with one leg straight. Bend the other knee and place your foot flat on the floor. Tighten your abdominal muscles. Keep your leg straight and slowly lift it straight up 6 to 12 inches off the floor. Hold this position. Lower your leg slowly. Do as many repetitions as directed on this side. Repeat with the other leg.         Plank:  Lie on your stomach. Bend your elbows and place your forearms flat on the floor. Lift your chest, stomach, and knees off the floor. Make sure your elbows are below your shoulders. Your body should be in a straight line. Do not let your hips or lower back sink to the ground. Squeeze your abdominal muscles together and hold for 15 seconds. To make this exercise harder, hold for 30 seconds or lift 1 leg at a time.         Bicycles:  Lie on your back. Bend both knees and bring them toward your chest. Your calves should be parallel to the floor. Place the palms of your hands on the back of your head. Straighten your right leg and keep it lifted 2 inches off the floor. Raise your head and shoulders off the floor and twist towards your left. Keep your head and shoulders lifted. Bend your right knee while you straighten your left leg. Keep your left leg 2 inches off the floor. Twist your head and chest towards the left leg. Continue to straighten 1 leg at a time and twist.       Follow up with your doctor or physical therapist as directed:  Write down your questions so you remember to ask them during your visits.  © Copyright [employer] 2023 Information is for End User's use only and may not be sold, redistributed or otherwise used for commercial purposes.  The above information is an  only. It is not  intended as medical advice for individual conditions or treatments. Talk to your doctor, nurse or pharmacist before following any medical regimen to see if it is safe and effective for you.  Neck Exercises   AMBULATORY CARE:   Neck exercises  help reduce neck pain, and improve neck movement and strength. Neck exercises also help prevent long-term neck problems.  Call your doctor if:   Your pain does not get better, or gets worse.    You have questions or concerns about your condition, care, or exercise program.    What you need to know about exercise safety:   Move slowly, gently, and smoothly.  Avoid fast or jerky motions.    Stand and sit the way your healthcare provider shows you.  Good posture may reduce your neck pain. Check your posture often, even when you are not doing your neck exercises.    Follow the exercise program recommended by your healthcare provider.  He or she will tell you which exercises are best for your condition. He or she will also tell you how many repetitions to do and how often you should do the exercises.    How to perform neck exercises safely:   Exercise position:  You may sit or stand while you do neck exercises. Face forward. Your shoulders should be straight and relaxed, with a good posture.         Head tilts, forward and back:  Gently bow your head and try to touch your chin to your chest. Your healthcare provider may tell you to push on the back of your neck to help bow your head. Raise your chin back to the starting position. Tilt your head back as far as possible so you are looking up at the ceiling. Your healthcare provider may tell you to lift your chin to help tilt your head back. Return your head to the starting position.         Head tilts, side to side:  Tilt your head, bringing your ear toward your shoulder. Then tilt your head toward the other shoulder.         Head turns:  Turn your head to look over your shoulder. Tilt your chin down and try to touch it to your  shoulder. Do not raise your shoulder to your chin. Face forward again. Do the same on the other side.         Head rolls:  Slowly bring your chin toward your chest. Next, roll your head to the right. Your ear should be positioned over your shoulder. Hold this position for 5 seconds. Roll your head back toward your chest and to the left into the same position. Hold for 5 seconds. Gently roll your head back and around in a clockwise [place] 3 times. Next, move your head in the reverse direction (counterclockwise) in a [place] 3 times. Do not shrug your shoulders upwards while you do this exercise.       Follow up with your doctor as directed:  Write down your questions so you remember to ask them during your visits.  © Copyright [employer] 2023 Information is for End User's use only and may not be sold, redistributed or otherwise used for commercial purposes.  The above information is an  only. It is not intended as medical advice for individual conditions or treatments. Talk to your doctor, nurse or pharmacist before following any medical regimen to see if it is safe and effective for you.

## 2024-05-17 NOTE — PROGRESS NOTES
Assessment  1. Cervical radiculopathy  2. Cervical spondylosis  3. Lumbar radiculopathy    Limited relief of pain or improved ability to participate with IADLs after ILESI at C7-T1; gabapentin helping significantly with regard to pain symptoms without excessive sedation. Has been participant with physical therapy. Continues to report the following symptomatology:     Right, greater than left sided cervical radicular pain in C7 dermatomal distribution accompanied by pain limited weakness numbness and paresthesias.  Patient has been a full participant with PT. Chronic pain with decreased participation with IADLs over the past few years.  Has been taking NSAIDs and tramadol infrequently with modest benefit.  5/5 strength bilaterally in upper extremities with AROM, negative spurling's maneuver,b/l.  Additionally there is positive cervical facet loading eliciting pain, left greater than right. Negative Ballard's, denies any gait instability, saddle anesthesia. On imaging Scattered multilevel spondylotic changes most pronounced at C3-C4 acentrically on the left.  No cord compression or cord signal abnormality.  Risks, benefits alternatives to epidural steroid injections thoroughly discussed with patient.  Handouts provided questions answered to patient's satisfaction.  Lifestyle modifications extensively discussed including sleep hygiene, neck posture, diet, exercise and weight loss in conjunction with PT.  Will proceed with multimodal pain therapy plan as noted below:    Right sided lumbar radicular pain in the L5 and S1 dermatomal distribution accompanied by pain limited weakness numbness and paresthesias.  Patient has not yet participated with PT. Chronic pain with decreased participation with IADLs over the past 3 months.  Has been taking OTC ibuprofen and tylenol in addition to gabapentin with modest benefit.  5/5 strength bilaterally, positive SLR left-sided. Reflexes 2+.  Additionally there is positive facet  loading, left greater than right. Denies any gait instability, saddle anesthesia. On xray imaging mild facet arthrosis in lowe rlumbar spine.  Risks, benefits alternatives epidural steroid injections thoroughly discussed with patient.  Handouts provided questions answered to patient's satisfaction.  Lifestyle modifications extensively discussed including diet, exercise and weight loss in addition to core strengthening.  Will proceed with multimodal pain therapy plan as noted below:    Plan  -MRI Cervical, Lumbar spine; will f/u result with patietn  -gabapentin increased to 600 mg t.i.d. Ordered for patient; counseled regarding sedative effects of taking this medication and provided up titration calendar.  Counseled not to take medication while driving or operating heavy machinery/using stairs  -ibuprofen 600 mg t.i.d. prn pain previously prescribed.  Patient educated regarding bleeding risk of taking this medication as well as not taking any other nonsteroidal anti-inflammatory medications while taking this medication; counseled thoroughly regarding potential risk of Cardiovascular injury, Kidney injury, Gastrointestinal ulceration/bleeding. Patient voiced understanding  -script provided for formal physical therapy for right-sided cervical and lumbar radiculopathy; Physician directed home exercise plan as per [facility] demonstrated and handouts provided that patient plans to participate with for 1 hour, twice a week for the next 6 weeks.     There are risks associated with opioid medications, including dependence, addiction and tolerance. The patient understands and agrees to use these medications only as prescribed. Potential side effects of the medications include, but are not limited to, constipation, drowsiness, addiction, impaired judgment and risk of fatal overdose if not taken as prescribed. The patient was warned against driving while taking sedation medications or operating heavy machinery. The patient voiced  understanding. Sharing medications is a felony. At this point in time, the patient is showing no signs of addiction, abuse, diversion or suicidal ideation.     [place] Prescription Drug Monitoring Program report was reviewed and was appropriate      Complete risks and benefits including bleeding, infection, tissue reaction, nerve injury and allergic reaction were discussed. The approach was demonstrated using models and literature was provided. Verbal and written consent was obtained.     My impressions and treatment recommendations were discussed in detail with the patient who verbalized understanding and had no further questions.  Discharge instructions were provided. I personally saw and examined the patient and I agree with the above discussed plan of care.    New Medications Ordered This Visit   Medications    ibuprofen (MOTRIN) 200 mg tablet     Sig: Take by mouth every 6 (six) hours as needed for mild pain    Naproxen Sodium (Aleve) 220 MG CAPS     Sig: Take by mouth    acetaminophen (TYLENOL) 325 mg tablet     Sig: Take 650 mg by mouth every 6 (six) hours as needed for mild pain       History of Present Illness    Limited relief of pain or improved ability to participate with IADLs after ILESI at C7-T1; gabapentin helping significantly with regard to pain symptoms without excessive sedation. Has been participant with physical therapy. Continues to report the following symptomatology:     Domenic Albert is a 43 y.o. male with pmhx of obesity, HTN, DM-2, presenting with with a past medical history of right greater htan left-sided neck pain described primarily as radicular nature.  The pain radiates in the C5 and C6 dermatomal distributions and is primarily right more so than left-sided.  The pain contributes to significant disability in participation with independent activities of daily living and is accompanied by weakness numbness and paresthesias that are debilitating in nature.  The patient  describes that overhead maneuvers such as combing hair is significantly limiting with respect to strength in the right more so than left arm/hand. The patient notes significant weakness with right over left hand  as well. The patient has not been to physical therapy but trialed chiropractic therapy with modest relief.  He has trialed conservative measures including nsaids tylenol and gabapentin for the pain but has not trialed any steroids.  He has never had interventional pain procedures in the past including any cervical interlaminar epidural steroid injections in the past for this pain. Denies any gait abnormality, saddle anesthesia or bowel/bladder abnormality.    Subacute to chronic lumbar radicular pain in the right L5 and S1 dermatomal distributions. Debilitating pain limited weakness numbness and paresthesias accompany the pain. The patient rates the pain at a 8/10 accompanied by electric shock-like shooting features and crampy burning pain in the aforementioned dermatomal distributions.  The pain is worse in the mornings as well as the end of the day; exertion such as walking for long periods of time seems to exacerbate the pain.  He tries to maintain an active lifestyle and finds that the current degree of pain seems to compromises his efforts.  The pain significantly impacts independent activities of daily living and contributes to significant disability.  He has attempted chiropractic but not physical therapy with exacerbation of the pain.  He has taken nsaids, tylenol as well as gabapentin with limited relief of the pain as well.  He has never tried epidural steroid injections in the past. He denies any bowel or bladder dysfunction/incontinence, saddle anesthesia but endorses gait instability.    I have personally reviewed and/or updated the patient's past medical history, past surgical history, family history, social history, current medications, allergies, and vital signs today.     Review of  Systems   Constitutional:  Positive for activity change.   HENT: Negative.     Eyes: Negative.    Respiratory: Negative.     Cardiovascular: Negative.    Gastrointestinal: Negative.    Endocrine: Negative.    Genitourinary: Negative.    Musculoskeletal:  Positive for arthralgias, back pain, gait problem, myalgias, neck pain and neck stiffness.   Skin: Negative.    Allergic/Immunologic: Negative.    Neurological:  Positive for weakness and numbness.   Hematological: Negative.    Psychiatric/Behavioral: Negative.     All other systems reviewed and are negative.      Patient Active Problem List   Diagnosis    Cervical radiculopathy    Cervical spondylosis    Type 2 diabetes mellitus without complication, without long-term current use of insulin ([facility])       Past Medical History:   Diagnosis Date    Hypertension        Past Surgical History:   Procedure Laterality Date    APPENDECTOMY  2008    IR SPINE AND PAIN PROCEDURE  4/23/2024    NASAL SEPTUM SURGERY  2008    TOENAIL EXCISION  2000    TONSILLECTOMY  1994       History reviewed. No pertinent family history.    Social History     Occupational History    Not on file   Tobacco Use    Smoking status: Never    Smokeless tobacco: Never   Vaping Use    Vaping status: Never Used   Substance and Sexual Activity    Alcohol use: Yes    Drug use: Never    Sexual activity: Not on file       Current Outpatient Medications on File Prior to Visit   Medication Sig    acetaminophen (TYLENOL) 325 mg tablet Take 650 mg by mouth every 6 (six) hours as needed for mild pain    albuterol (PROVENTIL HFA,VENTOLIN HFA) 90 mcg/act inhaler Inhale 2 puffs every 4 (four) hours as needed PRN    citalopram (CeleXA) 10 mg tablet Take 10 mg by mouth every morning    gabapentin (NEURONTIN) 300 mg capsule Take 1 capsule (300 mg total) by mouth 3 (three) times a day    ibuprofen (MOTRIN) 200 mg tablet Take by mouth every 6 (six) hours as needed for mild pain    levothyroxine 150 mcg tablet TAKE 1 TABLET  "BY MOUTH ONCE DAILY BEFORE A MEAL EVERY MORNING-TAKE ON EMPTY STOMACH-DRINK PLENTY OF WATER.    lisinopril (ZESTRIL) 10 mg tablet Take 10 mg by mouth daily    metFORMIN (GLUCOPHAGE) 1000 MG tablet Take 1,000 mg by mouth 2 (two) times a day    Naproxen Sodium (Aleve) 220 MG CAPS Take by mouth    Acetaminophen Extra Strength 500 MG TABS take 1 tablet by mouth every 6 hours if needed for MILD PAIN ( PAIN SCALE 1-3 ) (Patient not taking: Reported on 5/17/2024)    ibuprofen (MOTRIN) 600 mg tablet Take 600 mg by mouth every 8 (eight) hours as needed (Patient not taking: Reported on 4/5/2024)    Procto-Med HC 2.5 % rectal cream APPLY INTERNALLY DAILY FOR 1 WEEK (Patient not taking: Reported on 5/17/2024)     No current facility-administered medications on file prior to visit.       Allergies   Allergen Reactions    Epoetin (Mando) Other (See Comments)     Other reaction(s): Sweats and fever    Levofloxacin Other (See Comments)         Physical Exam    /84 (BP Location: Left arm, Patient Position: Sitting, Cuff Size: Adult)   Pulse 78   Temp (!) 97.3 °F (36.3 °C)   Resp 18   Ht 6' 1\" (1.854 m)   Wt (!) 152 kg (335 lb)   SpO2 100%   BMI 44.20 kg/m²     Constitutional: normal, well developed, well nourished, alert, in no distress and non-toxic and no overt pain behavior. and obese  Eyes: anicteric  HEENT: grossly intact  Neck: supple, symmetric, trachea midline and no masses   Pulmonary:even and unlabored  Cardiovascular:No edema or pitting edema present  Skin:Normal without rashes or lesions and well hydrated  Psychiatric:Mood and affect appropriate  Neurologic:Cranial Nerves II-XII grossly intact Sensation grossly intact; no clonus negative rojas's. Reflexes 2+ and brisk. SLR negative bilaterally. Spurling's maneuver negative bilaterally.  Musculoskeletal:normal gait. 5/5 strength bilaterally with AROM in all extremities. Normal heel toe and tip toe walking. Significant pain with cervical and lumbar facet " loading bilaterally and with lateral spine rotation, ttp over cervical and lumbar paraspinal muscles, right greater than left. Negative estelle's test, negative gaenslen's negative SIJ loading bilaterally.    Imaging    LUMBAR SPINE     INDICATION:   Radiculopathy, lumbar region.      COMPARISON:  None.     VIEWS:  XR SPINE LUMBAR 2 OR 3 VIEWS INJURY  Images: 3     FINDINGS:     There are 5 non rib bearing lumbar vertebral bodies.      There is no evidence of acute fracture or destructive osseous lesion.     Alignment is unremarkable.      There is mild facet disease lower lumbar spine. Otherwise no significant lumbar degenerative change noted.     The pedicles appear intact.     Soft tissues are unremarkable.     IMPRESSION:        Mild facet disease lower lumbar spine. No other abnormality..    CT Cervical Spine Without Contrast   Exam date and time: 3/9/2024 6:16 PM   Age: 43 years old   Clinical indication: Injury or trauma; Blunt trauma; Patient HX: Hit head x 1   wk. Headache     TECHNIQUE:   Imaging protocol: Computed tomography of the cervical spine without contrast.   Radiation optimization: All CT scans at this facility use at least one of these   dose optimization techniques: automated exposure control; mA and/or kV   adjustment per patient size (includes targeted exams where dose is matched to   clinical indication); or iterative reconstruction.     COMPARISON:   CT NECK SOFT TISSUE W CONTRAST 10/12/2018 9:22 AM     FINDINGS:   Bones/joints: No acute fracture. Normal alignment.     Degenerative change is identified in the spine.  There is disc space narrowing   and osteophyte formation especially at C4/5.

## 2024-05-31 ENCOUNTER — HOSPITAL ENCOUNTER (OUTPATIENT)
Dept: MRI IMAGING | Facility: HOSPITAL | Age: 44
End: 2024-05-31
Attending: ANESTHESIOLOGY
Payer: COMMERCIAL

## 2024-05-31 DIAGNOSIS — M54.12 CERVICAL RADICULOPATHY: ICD-10-CM

## 2024-05-31 DIAGNOSIS — M47.812 CERVICAL SPONDYLOSIS: ICD-10-CM

## 2024-05-31 PROCEDURE — 72141 MRI NECK SPINE W/O DYE: CPT

## 2024-06-10 ENCOUNTER — TELEPHONE (OUTPATIENT)
Age: 44
End: 2024-06-10

## 2024-06-10 DIAGNOSIS — M54.9 MID BACK PAIN: Primary | ICD-10-CM

## 2024-06-10 NOTE — TELEPHONE ENCOUNTER
Caller: Domenic     Doctor: Priscila     Reason for call: Patient calling asking if Dr Francois reviewed MRI and next steps patient states Mri Lumbar was denied please advise     Call back#: [phone]

## 2024-06-13 NOTE — TELEPHONE ENCOUNTER
Caller: Domenic     Doctor: Priscila     Reason for call: Patient returning call from nurse to review MRI results please advise     Call back#: [phone]

## 2024-06-26 NOTE — TELEPHONE ENCOUNTER
[facility].[phone]

## 2024-06-27 ENCOUNTER — APPOINTMENT (OUTPATIENT)
Dept: RADIOLOGY | Facility: CLINIC | Age: 44
End: 2024-06-27
Payer: COMMERCIAL

## 2024-06-27 DIAGNOSIS — M54.9 MID BACK PAIN: ICD-10-CM

## 2024-06-27 PROCEDURE — 72072 X-RAY EXAM THORAC SPINE 3VWS: CPT

## 2024-06-27 NOTE — TELEPHONE ENCOUNTER
"S/w pt, pt inquired about getting an MRI of t-spine as his right arm is now numb and feels like \"ice\", pt is unsure as to why after starting PT for back this numbness in right hand started.   Pt's lumbar spine mri was denied, nurse did advise pt he should complete 6 sessions, 1 session a week of PT for 6 weeks for MRI to be authorized by insurance.  PT order is for cervical and lumbar. Nurse advised pt nurse to discuss with VS and CB with recommendations. Pt verbalized understanding and appreciative of call.    VS will you order thoracic MRI and does pt need an updated PT to include thoracic spine?  "

## 2024-06-27 NOTE — TELEPHONE ENCOUNTER
Caller: Patient     Doctor:      Reason for call: Patient made aware and will go for xr    Call back#: n/a

## 2024-06-27 NOTE — TELEPHONE ENCOUNTER
Attempted to reach pt, LMOM with CB# and OH.  Abelardo Francois MD  You; [facility] [place] [facility] minutes ago (2:11 PM)       I'll put in for xray thoracic spine; I think he should get this before we order MRI; thanks

## 2024-07-05 ENCOUNTER — TELEPHONE (OUTPATIENT)
Dept: PAIN MEDICINE | Facility: CLINIC | Age: 44
End: 2024-07-05

## 2024-07-05 ENCOUNTER — OFFICE VISIT (OUTPATIENT)
Dept: PAIN MEDICINE | Facility: CLINIC | Age: 44
End: 2024-07-05
Payer: COMMERCIAL

## 2024-07-05 VITALS
WEIGHT: 315 LBS | DIASTOLIC BLOOD PRESSURE: 74 MMHG | BODY MASS INDEX: 42.66 KG/M2 | OXYGEN SATURATION: 98 % | HEART RATE: 93 BPM | TEMPERATURE: 98.1 F | HEIGHT: 72 IN | SYSTOLIC BLOOD PRESSURE: 118 MMHG

## 2024-07-05 DIAGNOSIS — M54.12 CERVICAL RADICULOPATHY: Primary | ICD-10-CM

## 2024-07-05 DIAGNOSIS — M54.16 LUMBAR RADICULOPATHY: ICD-10-CM

## 2024-07-05 DIAGNOSIS — M54.14 THORACIC RADICULITIS: ICD-10-CM

## 2024-07-05 PROCEDURE — 99214 OFFICE O/P EST MOD 30 MIN: CPT | Performed by: ANESTHESIOLOGY

## 2024-07-05 RX ORDER — FLUTICASONE PROPIONATE 50 MCG
SPRAY, SUSPENSION (ML) NASAL
COMMUNITY
Start: 2024-05-16 | End: 2024-07-05

## 2024-07-05 RX ORDER — KETOROLAC TROMETHAMINE 5 MG/ML
SOLUTION OPHTHALMIC
COMMUNITY
Start: 2024-07-03

## 2024-07-05 RX ORDER — OFLOXACIN 3 MG/ML
SOLUTION/ DROPS OPHTHALMIC
COMMUNITY
Start: 2024-07-03

## 2024-07-05 RX ORDER — PREDNISOLONE ACETATE 10 MG/ML
SUSPENSION/ DROPS OPHTHALMIC
COMMUNITY
Start: 2024-07-03

## 2024-07-05 NOTE — TELEPHONE ENCOUNTER
----- Message from Abelardo Francois MD sent at 7/5/2024 10:35 AM EDT -----    No significant degenerative changes on xray; should complete PT prior to ordering MRI thoracic spine, thanks  ----- Message -----  From: Interface, Radiology Results In  Sent: 7/4/2024   1:49 PM EDT  To: Abelardo Francois MD

## 2024-07-05 NOTE — PROGRESS NOTES
Assessment  1. Cervical radiculopathy  2. Lumbar radiculopathy    Limited relief of pain or improved ability to participate with IADLs after ILESI at C7-T1; no significant degenerative changes noted on cervical spine. gabapentin helping significantly with regard to pain symptoms without excessive sedation. Has been participant with physical therapy. Continues to report the following symptomatology:     Right, greater than left sided cervical radicular pain in C7 dermatomal distribution accompanied by pain limited weakness numbness and paresthesias.  Patient has been a full participant with PT. Chronic pain with decreased participation with IADLs over the past few years.  Has been taking NSAIDs and tramadol infrequently with modest benefit.  5/5 strength bilaterally in upper extremities with AROM, negative spurling's maneuver,b/l.  Additionally there is positive cervical facet loading eliciting pain, left greater than right. Negative Ballard's, denies any gait instability, saddle anesthesia. On imaging Scattered multilevel spondylotic changes most pronounced at C3-C4 acentrically on the left.  No cord compression or cord signal abnormality.  Risks, benefits alternatives to epidural steroid injections thoroughly discussed with patient.  Handouts provided questions answered to patient's satisfaction.  Lifestyle modifications extensively discussed including sleep hygiene, neck posture, diet, exercise and weight loss in conjunction with PT.  Will proceed with multimodal pain therapy plan as noted below:    Right sided lumbar radicular pain in the L5 and S1 dermatomal distribution accompanied by pain limited weakness numbness and paresthesias.  Patient has not yet participated with PT. Chronic pain with decreased participation with IADLs over the past 3 months.  Has been taking OTC ibuprofen and tylenol in addition to gabapentin with modest benefit.  5/5 strength bilaterally, positive SLR left-sided. Reflexes 2+.   Additionally there is positive facet loading, left greater than right. Denies any gait instability, saddle anesthesia. On xray imaging mild facet arthrosis in lowe rlumbar spine.  Risks, benefits alternatives epidural steroid injections thoroughly discussed with patient.  Handouts provided questions answered to patient's satisfaction.  Lifestyle modifications extensively discussed including diet, exercise and weight loss in addition to core strengthening.  Will proceed with multimodal pain therapy plan as noted below:    Plan  -MRI thoracic, Lumbar spine; will f/u result with patient  -EMG ordered for RUE; will f/u result  -gabapentin 600 mg t.i.d. Ordered for patient; counseled regarding sedative effects of taking this medication and provided up titration calendar.  Counseled not to take medication while driving or operating heavy machinery/using stairs  -ibuprofen 600 mg t.i.d. prn pain previously prescribed.  Patient educated regarding bleeding risk of taking this medication as well as not taking any other nonsteroidal anti-inflammatory medications while taking this medication; counseled thoroughly regarding potential risk of Cardiovascular injury, Kidney injury, Gastrointestinal ulceration/bleeding. Patient voiced understanding  -script provided for formal physical therapy for right-sided cervical and lumbar radiculopathy; Physician directed home exercise plan as per [facility] demonstrated and handouts provided that patient plans to participate with for 1 hour, twice a week for the next 6 weeks.     There are risks associated with opioid medications, including dependence, addiction and tolerance. The patient understands and agrees to use these medications only as prescribed. Potential side effects of the medications include, but are not limited to, constipation, drowsiness, addiction, impaired judgment and risk of fatal overdose if not taken as prescribed. The patient was warned against driving while taking sedation  medications or operating heavy machinery. The patient voiced understanding. Sharing medications is a felony. At this point in time, the patient is showing no signs of addiction, abuse, diversion or suicidal ideation.     [place] Prescription Drug Monitoring Program report was reviewed and was appropriate      Complete risks and benefits including bleeding, infection, tissue reaction, nerve injury and allergic reaction were discussed. The approach was demonstrated using models and literature was provided. Verbal and written consent was obtained.     My impressions and treatment recommendations were discussed in detail with the patient who verbalized understanding and had no further questions.  Discharge instructions were provided. I personally saw and examined the patient and I agree with the above discussed plan of care.    New Medications Ordered This Visit   Medications    fluticasone (FLONASE) 50 mcg/act nasal spray     Sig: USE 2 SPRAY(S) IN EACH NOSTRIL ONCE DAILY FOR NOSE INFLAMMATION    prednisoLONE acetate (PRED FORTE) 1 % ophthalmic suspension     Sig: POSTOP INSTILL 1 DROP INTO OPERATIVE EYES FOUR TIMES A DAY FOR 2 WEEKS THEN TWICE DAILY FOR 2 WEEKS    ketorolac (ACULAR) 0.5 % ophthalmic solution     Sig: START 3 DAYS PREOP INSTILL 1 DROP INTO OPERATIVE EYES FOUR TIMES DAILY CONTINUE FOR 4 WEEKS POSTOP    ofloxacin (OCUFLOX) 0.3 % ophthalmic solution     Sig: START 3 DAYS PREOP INSTILL 1 DROP INTO OPERATIVE EYES FOUR TIMES DAILY FOR 1 WEEK POSTOP       History of Present Illness    Limited relief of pain or improved ability to participate with IADLs after ILESI at C7-T1; gabapentin helping significantly with regard to pain symptoms without excessive sedation. Has been participant with physical therapy. Continues to report the following symptomatology:     Domenic Albert is a 43 y.o. male with pmhx of obesity, HTN, DM-2, presenting with with a past medical history of right greater htan left-sided  neck pain described primarily as radicular nature.  The pain radiates in the C5 and C6 dermatomal distributions and is primarily right more so than left-sided.  The pain contributes to significant disability in participation with independent activities of daily living and is accompanied by weakness numbness and paresthesias that are debilitating in nature.  The patient describes that overhead maneuvers such as combing hair is significantly limiting with respect to strength in the right more so than left arm/hand. The patient notes significant weakness with right over left hand  as well. The patient has not been to physical therapy but trialed chiropractic therapy with modest relief.  He has trialed conservative measures including nsaids tylenol and gabapentin for the pain but has not trialed any steroids.  He has never had interventional pain procedures in the past including any cervical interlaminar epidural steroid injections in the past for this pain. Denies any gait abnormality, saddle anesthesia or bowel/bladder abnormality.    Subacute to chronic lumbar radicular pain in the right L5 and S1 dermatomal distributions. Debilitating pain limited weakness numbness and paresthesias accompany the pain. The patient rates the pain at a 8/10 accompanied by electric shock-like shooting features and crampy burning pain in the aforementioned dermatomal distributions.  The pain is worse in the mornings as well as the end of the day; exertion such as walking for long periods of time seems to exacerbate the pain.  He tries to maintain an active lifestyle and finds that the current degree of pain seems to compromises his efforts.  The pain significantly impacts independent activities of daily living and contributes to significant disability.  He has attempted chiropractic but not physical therapy with exacerbation of the pain.  He has taken nsaids, tylenol as well as gabapentin with limited relief of the pain as well.  He  has never tried epidural steroid injections in the past. He denies any bowel or bladder dysfunction/incontinence, saddle anesthesia but endorses gait instability.    I have personally reviewed and/or updated the patient's past medical history, past surgical history, family history, social history, current medications, allergies, and vital signs today.     Review of Systems   Constitutional:  Positive for activity change.   HENT: Negative.     Eyes: Negative.    Respiratory: Negative.     Cardiovascular: Negative.    Gastrointestinal: Negative.    Endocrine: Negative.    Genitourinary: Negative.    Musculoskeletal:  Positive for arthralgias, back pain, gait problem, myalgias, neck pain and neck stiffness.   Skin: Negative.    Allergic/Immunologic: Negative.    Neurological:  Positive for weakness and numbness.   Hematological: Negative.    Psychiatric/Behavioral: Negative.     All other systems reviewed and are negative.      Patient Active Problem List   Diagnosis    Cervical radiculopathy    Cervical spondylosis    Type 2 diabetes mellitus without complication, without long-term current use of insulin (HCC)    Mid back pain       Past Medical History:   Diagnosis Date    Hypertension        Past Surgical History:   Procedure Laterality Date    APPENDECTOMY  2008    IR SPINE AND PAIN PROCEDURE  4/23/2024    NASAL SEPTUM SURGERY  2008    TOENAIL EXCISION  2000    TONSILLECTOMY  1994       History reviewed. No pertinent family history.    Social History     Occupational History    Not on file   Tobacco Use    Smoking status: Never    Smokeless tobacco: Never   Vaping Use    Vaping status: Never Used   Substance and Sexual Activity    Alcohol use: Yes    Drug use: Never    Sexual activity: Not on file       Current Outpatient Medications on File Prior to Visit   Medication Sig    acetaminophen (TYLENOL) 325 mg tablet Take 650 mg by mouth every 6 (six) hours as needed for mild pain    albuterol (PROVENTIL HFA,VENTOLIN  HFA) 90 mcg/act inhaler Inhale 2 puffs every 4 (four) hours as needed PRN    citalopram (CeleXA) 10 mg tablet Take 10 mg by mouth every morning    gabapentin (Neurontin) 600 MG tablet Take 1 tablet (600 mg total) by mouth 3 (three) times a day    ibuprofen (MOTRIN) 200 mg tablet Take by mouth every 6 (six) hours as needed for mild pain    ketorolac (ACULAR) 0.5 % ophthalmic solution START 3 DAYS PREOP INSTILL 1 DROP INTO OPERATIVE EYES FOUR TIMES DAILY CONTINUE FOR 4 WEEKS POSTOP    levothyroxine 150 mcg tablet TAKE 1 TABLET BY MOUTH ONCE DAILY BEFORE A MEAL EVERY MORNING-TAKE ON EMPTY STOMACH-DRINK PLENTY OF WATER.    lisinopril (ZESTRIL) 10 mg tablet Take 10 mg by mouth daily    metFORMIN (GLUCOPHAGE) 1000 MG tablet Take 1,000 mg by mouth 2 (two) times a day    Naproxen Sodium (Aleve) 220 MG CAPS Take by mouth    ofloxacin (OCUFLOX) 0.3 % ophthalmic solution START 3 DAYS PREOP INSTILL 1 DROP INTO OPERATIVE EYES FOUR TIMES DAILY FOR 1 WEEK POSTOP    prednisoLONE acetate (PRED FORTE) 1 % ophthalmic suspension POSTOP INSTILL 1 DROP INTO OPERATIVE EYES FOUR TIMES A DAY FOR 2 WEEKS THEN TWICE DAILY FOR 2 WEEKS    ALPRAZolam (XANAX) 0.5 mg tablet Take 1 tablet (0.5 mg total) by mouth 30 min pre-procedure (Patient not taking: Reported on 7/5/2024)    fluticasone (FLONASE) 50 mcg/act nasal spray USE 2 SPRAY(S) IN EACH NOSTRIL ONCE DAILY FOR NOSE INFLAMMATION (Patient not taking: Reported on 7/5/2024)    Procto-Med HC 2.5 % rectal cream APPLY INTERNALLY DAILY FOR 1 WEEK (Patient not taking: Reported on 5/17/2024)     No current facility-administered medications on file prior to visit.       Allergies   Allergen Reactions    Epoetin (Mando) Other (See Comments)     Other reaction(s): Sweats and fever    Levofloxacin Other (See Comments)         Physical Exam    /74 (BP Location: Left arm, Patient Position: Sitting, Cuff Size: Large)   Pulse 93   Temp 98.1 °F (36.7 °C)   Ht 6' (1.829 m)   Wt (!) 156 kg (343 lb)    SpO2 98%   BMI 46.52 kg/m²     Constitutional: normal, well developed, well nourished, alert, in no distress and non-toxic and no overt pain behavior. and obese  Eyes: anicteric  HEENT: grossly intact  Neck: supple, symmetric, trachea midline and no masses   Pulmonary:even and unlabored  Cardiovascular:No edema or pitting edema present  Skin:Normal without rashes or lesions and well hydrated  Psychiatric:Mood and affect appropriate  Neurologic:Cranial Nerves II-XII grossly intact Sensation grossly intact; no clonus negative rojas's. Reflexes 2+ and brisk. SLR negative bilaterally. Spurling's maneuver negative bilaterally.  Musculoskeletal:normal gait. 5/5 strength bilaterally with AROM in all extremities. Normal heel toe and tip toe walking. Significant pain with cervical and lumbar facet loading bilaterally and with lateral spine rotation, ttp over cervical and lumbar paraspinal muscles, right greater than left. Negative estelle's test, negative gaenslen's negative SIJ loading bilaterally.    Imaging    LUMBAR SPINE     INDICATION:   Radiculopathy, lumbar region.      COMPARISON:  None.     VIEWS:  XR SPINE LUMBAR 2 OR 3 VIEWS INJURY  Images: 3     FINDINGS:     There are 5 non rib bearing lumbar vertebral bodies.      There is no evidence of acute fracture or destructive osseous lesion.     Alignment is unremarkable.      There is mild facet disease lower lumbar spine. Otherwise no significant lumbar degenerative change noted.     The pedicles appear intact.     Soft tissues are unremarkable.     IMPRESSION:        Mild facet disease lower lumbar spine. No other abnormality..    CT Cervical Spine Without Contrast   Exam date and time: 3/9/2024 6:16 PM   Age: 43 years old   Clinical indication: Injury or trauma; Blunt trauma; Patient HX: Hit head x 1   wk. Headache     TECHNIQUE:   Imaging protocol: Computed tomography of the cervical spine without contrast.   Radiation optimization: All CT scans at this facility  use at least one of these   dose optimization techniques: automated exposure control; mA and/or kV   adjustment per patient size (includes targeted exams where dose is matched to   clinical indication); or iterative reconstruction.     COMPARISON:   CT NECK SOFT TISSUE W CONTRAST 10/12/2018 9:22 AM     FINDINGS:   Bones/joints: No acute fracture. Normal alignment.     Degenerative change is identified in the spine.  There is disc space narrowing   and osteophyte formation especially at C4/5.

## 2024-07-05 NOTE — PATIENT INSTRUCTIONS
"Patient Education     Back exercises   The Basics   Written by the doctors and editors at [facility], [place]   Why do I need to do back exercises? -- Back exercises can help ease back pain and might help prevent future back pain. Long term, it is important to strengthen the muscles in your lower back, buttocks, and belly. These are your \"core muscles.\" Stretching exercises are also important to keep your muscles flexible.  Below are some stretching and strengthening exercises that might help you. Other forms of movement can help ease or prevent back pain, too. For example, some people like to walk, do aerobic exercise, or do yoga or gabby chi. The most important thing is to move your body. Your doctor, nurse, or physical therapist can help you find different types of activity that work for you.  What stretching exercises should I do? -- Below are some examples of stretching exercises. Warm up your muscles before stretching to help prevent injury. To warm up, you can walk, jog, or cycle for a few minutes.  Start by repeating each of these stretches 2 to 3 times. Try to hold each stretch for 5 to 10 seconds, and try to do the stretches 2 to 3 times each day. Breathe slowly and deeply as you do the exercises. Never bounce when doing stretches.   Cat-cow stretch (figure 1) - Start on all fours on the floor, with your hands under your shoulders, knees under your hips, and your back flat. First, tuck your chin and tighten your stomach muscles as you round your back (like a \"cat\"). Hold the stretch for about 10 seconds. Rest for a few seconds as you flatten your back. Next, lift your chin and let your belly and lower back sink toward the floor (like a \"cow\"). Hold the stretch for about 10 seconds.   Single knee-to-chest stretches (figure 2) ? While lying on your back, bend your knees with your feet flat on the floor. Pull 1 knee toward your chest until you feel a stretch in your lower back and buttock area. Lower, and repeat with the " other knee. If you have knee problems, pull your knee up by grabbing the back of your thigh instead of the front of your knee. You can also do this exercise by grabbing both knees at the same time.   Lower trunk rotations (figure 3) ? While lying on your back, bend your knees with your feet flat on the floor. Keep your knees and ankles together, and then drop them to 1 side. Keep both of your shoulders touching the floor until you feel a stretch in the muscles at the side of the back. Repeat on the other side.   Lower back stretches seated (figure 4) ? Sit in a chair with your feet spread about shoulder width apart. Then, lean forward until you feel a stretch in your lower back.  What strengthening exercises should I do? -- Below are some examples of strengthening exercises.  Start by doing each exercise 2 to 3 times. Work up to doing each exercise 10 times. Hold each exercise for 3 to 5 seconds, and try to do the exercises 2 to 3 times each day. Do all exercises slowly.   Shoulder blade squeezes (figure 5) ? Pinch your shoulder blades together on your upper back, and hold 3 to 5 seconds. You can also do these 1 side at a time. Sit with good posture, and make sure that your shoulders do not rise up when you do this exercise. Relax.   Pelvic tilts (figure 6) ? Lie on your back with your knees bent and feet flat on the floor. Tighten your stomach muscles, and press your lower back down to the floor. Relax. You should be able to breathe comfortably during this exercise.   Hip lifts (figure 7) ? Lie on your back with your knees bent and feet flat on the floor. Tighten your stomach muscles, keep your back flat, and lift your buttocks off of the floor. Relax. You should feel this in your buttocks, not in your lower back.  What else should I know?    Exercise, including stretching, might be slightly uncomfortable. But you should not have sharp or severe pain. If you do get severe pain, stop what you are doing. If severe  "pain continues, call your doctor or nurse.   Do not hold your breath when exercising. If you tend to hold your breath, try counting out loud when exercising. If any exercise bothers you, stop right away.   Always warm up before exercising. Warm muscles stretch much easier than cool muscles. Stretching cool muscles can lead to injury.   Doing exercises before a meal can be a good way to get into a routine.  All topics are updated as new evidence becomes available and our peer review process is complete.  This topic retrieved from [employer] [facility] on: Apr 03, 2024.  Topic 157990 Version 2.0  Release: 32.2.4 - C32.92  © 2024 [employer], [employer]. and/or its affiliates. All rights reserved.  figure 1: Cat-cow stretch     Start on all fours on the floor, with hands under your shoulders, knees under your hips, and your back flat. First, tuck your chin and tighten your stomach muscles as you round your back (like a \"cat\"). Hold the stretch for about 10 seconds. Rest for a few seconds as you flatten your back. Next, lift your chin and let your belly and lower back sink toward the floor (like a \"cow\"). Hold the stretch for about 10 seconds.  Graphic [id number] Version 1.0  figure 2: Single knee-to-chest stretch     Lie on your back, bend your knees, and have your feet flat on the floor. Pull 1 knee toward your chest until you feel a stretch in your lower back and buttock area. Repeat with the other knee. If you have knee problems, pull your knee up by grabbing the back of your thigh instead of the front of your knee. You can also do this exercise by grabbing both knees at the same time.  Graphic [id number] Version 1.0  figure 3: Lower trunk rotation     While lying on your back, bend your knees and have your feet flat on the floor. Keep your legs together and then drop them to 1 side. Keep both of your shoulders touching the floor until you feel a stretch in the muscles at the side of the back. Repeat on the other side.  Graphic [id number] Version " 1.0  figure 4: Lower back stretch     Sit in a chair with your feet spread about shoulder width apart. Then, lean forward until you feel a stretch in your lower back.  Graphic [id number] Version 1.0  figure 5: Shoulder blade squeezes     Pinch your shoulder blades together on your upper back and hold for 3 to 5 seconds. Make sure that you are sitting with good posture and that your shoulders do not raise up when you do this exercise. Relax.  Graphic [id number] Version 1.0  figure 6: Pelvic tilts     Lie on your back with your knees bent and feet flat on the floor. Tighten your stomach muscles and press your lower back down to the floor. Relax.  Graphic [id number] Version 1.0  figure 7: Hip lifts     Lie on your back with your knees bent and feet flat on the floor. Tighten your stomach muscles and lift your buttocks off of the floor. Relax.  Graphic [id number] Version 1.0  Consumer Information Use and Disclaimer   Disclaimer: This generalized information is a limited summary of diagnosis, treatment, and/or medication information. It is not meant to be comprehensive and should be used as a tool to help the user understand and/or assess potential diagnostic and treatment options. It does NOT include all information about conditions, treatments, medications, side effects, or risks that may apply to a specific patient. It is not intended to be medical advice or a substitute for the medical advice, diagnosis, or treatment of a health care provider based on the health care provider's examination and assessment of a patient's specific and unique circumstances. Patients must speak with a health care provider for complete information about their health, medical questions, and treatment options, including any risks or benefits regarding use of medications. This information does not endorse any treatments or medications as safe, effective, or approved for treating a specific patient. Up[employer] and its affiliates disclaim any warranty or  liability relating to this information or the use thereof.The use of this information is governed by the Terms of Use, available at https://www.wolterskluwer.com/en/know/clinical-effectiveness-terms. 2024© [employer]. and its affiliates and/or licensors. All rights reserved.  Copyright   © 2024 [employer]. and/or its affiliates. All rights reserved.  Patient Education     Neck Stretches   About this topic   Stretching is a kind of exercise. When you stretch, you make a specific muscle or group of muscles longer. Stretching is good for you. It increases blood flow to a muscle. This can help get your muscles ready for other exercises. Stretching can also help you relax and may keep you from hurting your muscles.  If you have neck problems, doing these exercises could make your problem worse.  General   Before starting with a program, ask your doctor if you are healthy enough to do these exercises. Your doctor may have you work with a  or physical therapist to make a safe exercise program to meet your needs.  Stretching Exercises   Stretching exercises keep your muscles flexible. They also stop them from getting tight. Start by doing each of these stretches 2 to 3 times. In order for your body to make changes, you will need to hold these stretches for 20 to 30 seconds. Try to do the stretches 2 to 3 times each day. Do all exercises slowly.  If you have balance problems, do not try standing stretches. There are other safer ways to stretch different muscles while sitting or lying down.  Passive neck stretches ? Put your left hand on top of your head. Your other arm can be at your side or behind your back. Pull your head toward your left shoulder until you feel a gentle stretch on the right side of your neck. Repeat on the other side using your other hand. Also, try this stretch by pulling in a diagonal direction. With your left hand on top of your head, pull your head down towards the direction of your left  "knee. You should feel this stretch towards the back on the right side of your neck. Repeat on the other side.  Active neck stretches:  Neck front-to-back motion ? Look down to the floor and then up at the ceiling.  Neck side-to-side motion ? Tilt your head to the side and bring your ear to your shoulder. Now, tilt your head to the other side.  Neck turning ? Turn only your head and look over your left shoulder. Now turn only your head and look over your right shoulder.  Corner stretches:  T position ? Stand about one foot away from a corner. Bend your elbows and bring your upper arms to shoulder height. Rest your arms on the wall. Keep your back straight and gently lean forward until you feel a stretch in the front of your chest and shoulders.  V position ? Stand about one foot away from a corner. With your elbows straight, put only your hands on the wall and make a letter \"V\". Keep your back straight and gently lean forward until you feel a stretch in the front of your chest and shoulders.  Shoulder circles ? Sit with your back straight. Raise just your shoulders up towards your ears. Move them back, down, and then forward in a [place]. Repeat, moving the shoulders in a [place] going forward.  Chin tucks ? Stand straight or lie down on your back. Tuck your chin in and lengthen the back of your neck. Return to the starting position and repeat. It may help to stand up against a wall during this exercise. Try gently pushing your chin with two fingers while trying to flatten your neck against the wall. If you do this exercise lying down, try using a small rolled up washcloth under your neck. Push down into the washcloth when tucking in your chin.             What will the results be?   Prevent injury  Improve flexibility  Improve motion  Improve body posture  Lower stress  Reduce pain  Helpful tips   Stay active and work out to keep your muscles strong and flexible.  Keep a healthy weight so there is not extra stress on " your joints. Eat a healthy diet to keep your muscles healthy.  Be sure you do not hold your breath when exercising. This can raise your blood pressure. If you tend to hold your breath, try counting out loud when exercising. If any exercise bothers you, stop right away.  Always warm up before stretching. Heated muscles stretch much easier than cool muscles. Stretching cool muscles can lead to injury.  Try walking and swinging your arms at an easy pace for a few minutes to warm up your muscles. Do this again after exercising.  Never bounce when doing stretches.  Doing exercises before a meal may be a good way to get into a routine.  Exercise may be slightly uncomfortable, but you should not have sharp pains. If you do get sharp pains, stop what you are doing. If the sharp pains continue, call your doctor.  Last Reviewed Date   2021-08-16  Consumer Information Use and Disclaimer   This generalized information is a limited summary of diagnosis, treatment, and/or medication information. It is not meant to be comprehensive and should be used as a tool to help the user understand and/or assess potential diagnostic and treatment options. It does NOT include all information about conditions, treatments, medications, side effects, or risks that may apply to a specific patient. It is not intended to be medical advice or a substitute for the medical advice, diagnosis, or treatment of a health care provider based on the health care provider's examination and assessment of a patient’s specific and unique circumstances. Patients must speak with a health care provider for complete information about their health, medical questions, and treatment options, including any risks or benefits regarding use of medications. This information does not endorse any treatments or medications as safe, effective, or approved for treating a specific patient. Up[employer], [employer] and its affiliates disclaim any warranty or liability relating to this  information or the use thereof. The use of this information is governed by the Terms of Use, available at https://www.wolterskluwer.com/en/know/clinical-effectiveness-terms   Copyright   Copyright © 2024 [employer], [employer] and its affiliates and/or licensors. All rights reserved.

## 2024-07-20 ENCOUNTER — HOSPITAL ENCOUNTER (OUTPATIENT)
Dept: MRI IMAGING | Facility: HOSPITAL | Age: 44
Discharge: HOME | End: 2024-07-20
Attending: ANESTHESIOLOGY
Payer: COMMERCIAL

## 2024-07-20 DIAGNOSIS — M54.14 THORACIC RADICULITIS: ICD-10-CM

## 2024-07-20 PROCEDURE — 72146 MRI CHEST SPINE W/O DYE: CPT

## 2024-07-25 ENCOUNTER — TELEPHONE (OUTPATIENT)
Dept: PAIN MEDICINE | Facility: CLINIC | Age: 44
End: 2024-07-25

## 2024-07-25 NOTE — TELEPHONE ENCOUNTER
Caller: Domenic    Doctor: Priscila    Reason for call: I made patient aware of message below, when patient uses R hand it goes numb when he forgets to take Gabapentin what do you recommend.     Call back#:[phone]

## 2024-07-25 NOTE — TELEPHONE ENCOUNTER
----- Message from Abelardo Francois MD sent at 7/25/2024  7:42 AM EDT -----  Please let patient know that there is no significant degenerative changes in thoracic spine; no further recommendations other than previously recommended conservative therapy; should follow up prn and continue work up with other providers  ----- Message -----  From: Interface, Radiology Results In  Sent: 7/25/2024   6:07 AM EDT  To: Abelardo Francois MD

## 2024-08-11 DIAGNOSIS — M54.16 LUMBAR RADICULOPATHY: ICD-10-CM

## 2024-08-11 DIAGNOSIS — M54.12 CERVICAL RADICULOPATHY: ICD-10-CM

## 2024-08-12 RX ORDER — GABAPENTIN 600 MG/1
600 TABLET ORAL 3 TIMES DAILY
Qty: 90 TABLET | Refills: 0 | Status: SHIPPED | OUTPATIENT
Start: 2024-08-12

## 2024-09-06 DIAGNOSIS — M54.12 CERVICAL RADICULOPATHY: ICD-10-CM

## 2024-09-06 DIAGNOSIS — M54.16 LUMBAR RADICULOPATHY: ICD-10-CM

## 2024-09-06 RX ORDER — GABAPENTIN 600 MG/1
600 TABLET ORAL 3 TIMES DAILY
Qty: 90 TABLET | Refills: 0 | Status: SHIPPED | OUTPATIENT
Start: 2024-09-06

## 2024-09-19 ENCOUNTER — HOSPITAL ENCOUNTER (OUTPATIENT)
Dept: NEUROLOGY | Facility: CLINIC | Age: 44
End: 2024-09-19
Payer: COMMERCIAL

## 2024-09-19 DIAGNOSIS — M54.12 CERVICAL RADICULOPATHY: ICD-10-CM

## 2024-09-19 PROBLEM — G56.01 RIGHT CARPAL TUNNEL SYNDROME: Status: ACTIVE | Noted: 2024-09-19

## 2024-09-19 PROCEDURE — 95910 NRV CNDJ TEST 7-8 STUDIES: CPT | Performed by: PSYCHIATRY & NEUROLOGY

## 2024-09-19 PROCEDURE — 95886 MUSC TEST DONE W/N TEST COMP: CPT | Performed by: PSYCHIATRY & NEUROLOGY

## 2024-09-20 ENCOUNTER — TELEPHONE (OUTPATIENT)
Dept: RADIOLOGY | Facility: CLINIC | Age: 44
End: 2024-09-20

## 2024-09-20 DIAGNOSIS — G56.01 CARPAL TUNNEL SYNDROME OF RIGHT WRIST: Primary | ICD-10-CM

## 2024-09-20 NOTE — TELEPHONE ENCOUNTER
----- Message from Abelardo Francois MD sent at 9/20/2024  2:04 AM EDT -----  Looks like he may have carpal tunnel syndrome; if he wishes to consult with orthopedics for release I can place referral, thanks  ----- Message -----  From: Catalina Bolivar MD  Sent: 9/19/2024   4:47 PM EDT  To: Abelardo Francois MD

## 2024-10-07 DIAGNOSIS — M54.16 LUMBAR RADICULOPATHY: ICD-10-CM

## 2024-10-07 DIAGNOSIS — M54.12 CERVICAL RADICULOPATHY: ICD-10-CM

## 2024-10-07 RX ORDER — GABAPENTIN 600 MG/1
600 TABLET ORAL 3 TIMES DAILY
Qty: 90 TABLET | Refills: 0 | Status: SHIPPED | OUTPATIENT
Start: 2024-10-07

## 2024-10-08 ENCOUNTER — OFFICE VISIT (OUTPATIENT)
Dept: OBGYN CLINIC | Facility: CLINIC | Age: 44
End: 2024-10-08
Payer: COMMERCIAL

## 2024-10-08 VITALS
SYSTOLIC BLOOD PRESSURE: 130 MMHG | WEIGHT: 315 LBS | TEMPERATURE: 97.3 F | HEART RATE: 86 BPM | DIASTOLIC BLOOD PRESSURE: 76 MMHG | BODY MASS INDEX: 40.43 KG/M2 | HEIGHT: 74 IN | OXYGEN SATURATION: 96 %

## 2024-10-08 DIAGNOSIS — G56.01 CARPAL TUNNEL SYNDROME OF RIGHT WRIST: Primary | ICD-10-CM

## 2024-10-08 PROCEDURE — 99204 OFFICE O/P NEW MOD 45 MIN: CPT | Performed by: STUDENT IN AN ORGANIZED HEALTH CARE EDUCATION/TRAINING PROGRAM

## 2024-10-08 RX ORDER — MELOXICAM 7.5 MG/1
7.5 TABLET ORAL DAILY
Qty: 14 TABLET | Refills: 0 | Status: SHIPPED | OUTPATIENT
Start: 2024-10-08

## 2024-10-08 RX ORDER — EPINEPHRINE 0.3 MG/.3ML
0.3 INJECTION SUBCUTANEOUS
COMMUNITY
Start: 2024-09-12

## 2024-10-08 RX ORDER — ALBUTEROL SULFATE 0.83 MG/ML
SOLUTION RESPIRATORY (INHALATION)
COMMUNITY
Start: 2024-09-12

## 2024-10-08 RX ORDER — BRIMONIDINE TARTRATE AND TIMOLOL MALEATE 2; 5 MG/ML; MG/ML
SOLUTION OPHTHALMIC
COMMUNITY
Start: 2024-09-30

## 2024-10-08 RX ORDER — ALPRAZOLAM 0.5 MG
0.5 TABLET ORAL 3 TIMES DAILY PRN
COMMUNITY
Start: 2024-05-17

## 2024-10-08 RX ORDER — FLUTICASONE PROPIONATE 50 MCG
SPRAY, SUSPENSION (ML) NASAL
COMMUNITY
Start: 2024-08-12

## 2024-10-08 NOTE — PROGRESS NOTES
ASSESSMENT/PLAN:      Right hand carpal tunnel syndrome.  His carpal  tunnel syndrome is moderate with EMG evidence of decreased motor signals however on examination he has full strength.  He does have questionable 2 point that is roughly at 6 mm however this is in both the ulnar and median nerve distributions and the patient had some difficulty with his exam.  Overall I think that if he has not had prior treatment he can be started on conservative management. We discussed the etiology and natural course of carpal tunnel syndrome.  We discussed that carpal tunnel syndrome is related to increased pressure on the nerve in the carpal canal at the level of the wrist.  Increasing pressure can be a result of wrist flexion or extension or changes to the contents of the carpal tunnel.  We discussed that progression of this condition from mild to severe can result in numbness and tingling as well as dysfunction of the hand and even atrophy and weakness to the thumb musculature.  Treatment options for this condition range from nonoperative to operative and the mainstays are nighttime splinting for nonoperative measures and carpal tunnel release for operative measures.  Trial of nonoperative measures for around 6 weeks is typically beneficial prior to any surgical intervention and can help to avoid surgery.  Surgical release is performed with a mini open approach and while it can be performed with local only or local and sedation, there are risks to the procedure that include bleeding, infection, damage to surrounding neurovascular structures, weakness, pillar pain, and persistence of symptoms.  I also discussed with the patient that if carpal tunnel persists in both wrists that surgical intervention can be performed simultaneously and that recovery is expedited.      Right cubital tunnel syndrome.  Provocative testing and his physical exam are consistent with cubital tunnel syndrome.  This would be mild to moderate.  We can  start on conservative treatment for this. We discussed the etiology and natural course of cubital tunnel syndrome at the elbow.  This is related to compression of the ulnar nerve at or around the medial epicondyle or FCU fascia.  Compression typically results in numbness to the hand, pain, and occasionally weakness.  We discussed that there are nonoperative and operative modalities for treatment and at the majority of patients benefit from non operative modalities.  Typically, this involves the night splinting and ergonomic adjustments to prevent direct pressure or compression of the ulnar nerve at the elbow.  Surgical treatment can involve in situ decompression or transposition if the nerve is unstable.      Bilateral lateral epicondylitis he has been dealing with this for over a year.  And has not had any prior treatments.  I will start him on conservative management with a referral to physical therapy.  We discussed that the majority of patients respond to nonoperative management.  Nonoperative management consists of bracing either at the elbow or at the wrist with oral anti-inflammatories and physical therapy designed to stretching the ECRB tendon.  If this fails we can consider an injection for corticosteroid.  We discussed that it is rare that the patients need surgery for this however it is possible if all nonoperative management fails.       After a thorough discussion of risks, benefits, and alternatives, patient is indicated for non-operative treatment.  Reviewed previous A1C.  Cock up wrist brace at night for nighttime splinting.  Elbow extension splinting for cubital tunnel syndrome  Physical therapy referral for tendon glides for the carpal tunnel as well as stretching exercises for the lateral epicondylitis  Meloxicam for pain associated  If the symptoms do not improve, they may require surgical intervention in form of carpal tunnel release.    Patient/Guardian verbalizes understanding and consent to  treatment plan. All questions answered.      _____________________________________________________  CHIEF COMPLAINT:  CC: right hand pain, numbness and tingling.       SUBJECTIVE:  Domenic Albert is a 44 y.o. right hand dominant male who presents for evaluation of right carpal tunnel syndrome. Patient had an EMG on 9/19/2024 that showed moderate median nerve compression. Patient was referred today for evaluation by Dr. Francois.   The patient reports he has pain in the right forearm and dorsum of wrist that has been present for about twenty years, which he believed was carpal tunnel syndrome. Patient states he was treated with a sling on his right arm in 2001. Patient also mentions he has been taking B6 as treatment for carpal tunnel syndrome. Patient reports he had an injury on March 2nd this year where he hit his head off of a concrete ceiling. Since that time, patient has noticed constant numbness and tingling throughout his right hand. Patient states numbness and tingling is constant in nature but worsens.     Location of the pain: right hand  Quality of  pain: aching and numb.   Severity of the pain: 3/10.   Aggravating symptoms:driving, gripping, and wrist position of flexion.   Night symptoms: yes  Tingling: yes  Constant numbness: yes  Dropping objects: yes  Increase in symptoms with increase use : yes    The treatment so far includes: nothing specific    Occupation: Air[employer]       PAST MEDICAL HISTORY:  Past Medical History:   Diagnosis Date    Hypertension        PAST SURGICAL HISTORY:  Past Surgical History:   Procedure Laterality Date    APPENDECTOMY  2008    EYE SURGERY Bilateral     IR SPINE AND PAIN PROCEDURE  04/23/2024    NASAL SEPTUM SURGERY  2008    TOENAIL EXCISION  2000    TONSILLECTOMY  1994       FAMILY HISTORY:  History reviewed. No pertinent family history.    SOCIAL HISTORY:  Social History     Tobacco Use    Smoking status: Never    Smokeless tobacco: Never   Vaping Use     Vaping status: Never Used   Substance Use Topics    Alcohol use: Yes     Alcohol/week: 6.0 standard drinks of alcohol     Types: 6 Cans of beer per week     Comment: 6 per day    Drug use: Never       MEDICATIONS:    Current Outpatient Medications:     acetaminophen (TYLENOL) 325 mg tablet, Take 650 mg by mouth every 6 (six) hours as needed for mild pain, Disp: , Rfl:     albuterol (2.5 mg/3 mL) 0.083 % nebulizer solution, USE 1 VIAL IN NEBULIZER EVERY 4 HOURS AS NEEDED FOR WHEEZING, Disp: , Rfl:     albuterol (PROVENTIL HFA,VENTOLIN HFA) 90 mcg/act inhaler, Inhale 2 puffs every 4 (four) hours as needed PRN, Disp: , Rfl:     ALPRAZolam (XANAX) 0.5 mg tablet, Take 0.5 mg by mouth Three times daily as needed, Disp: , Rfl:     brimonidine-timolol (COMBIGAN) 0.2-0.5 %, , Disp: , Rfl:     citalopram (CeleXA) 10 mg tablet, Take 10 mg by mouth every morning, Disp: , Rfl:     EPINEPHrine (EPIPEN) 0.3 mg/0.3 mL SOAJ, Inject 0.3 mg into a muscle, Disp: , Rfl:     fluticasone (FLONASE) 50 mcg/act nasal spray, INHALE 2 SPRAYS INTO EACH NOSTRIL TWICE DAILY FOR NOSE INFLAMMATION., Disp: , Rfl:     gabapentin (NEURONTIN) 600 MG tablet, TAKE 1 TABLET BY MOUTH THREE TIMES DAILY, Disp: 90 tablet, Rfl: 0    ibuprofen (MOTRIN) 200 mg tablet, Take by mouth every 6 (six) hours as needed for mild pain, Disp: , Rfl:     levothyroxine 150 mcg tablet, TAKE 1 TABLET BY MOUTH ONCE DAILY BEFORE A MEAL EVERY MORNING-TAKE ON EMPTY STOMACH-DRINK PLENTY OF WATER., Disp: , Rfl:     lisinopril (ZESTRIL) 10 mg tablet, Take 10 mg by mouth daily, Disp: , Rfl:     meloxicam (Mobic) 7.5 mg tablet, Take 1 tablet (7.5 mg total) by mouth daily, Disp: 14 tablet, Rfl: 0    metFORMIN (GLUCOPHAGE) 1000 MG tablet, Take 1,000 mg by mouth 2 (two) times a day, Disp: , Rfl:     ketorolac (ACULAR) 0.5 % ophthalmic solution, START 3 DAYS PREOP INSTILL 1 DROP INTO OPERATIVE EYES FOUR TIMES DAILY CONTINUE FOR 4 WEEKS POSTOP (Patient not taking: Reported on 10/8/2024),  "Disp: , Rfl:     Naproxen Sodium (Aleve) 220 MG CAPS, Take by mouth (Patient not taking: Reported on 10/8/2024), Disp: , Rfl:     ofloxacin (OCUFLOX) 0.3 % ophthalmic solution, START 3 DAYS PREOP INSTILL 1 DROP INTO OPERATIVE EYES FOUR TIMES DAILY FOR 1 WEEK POSTOP (Patient not taking: Reported on 10/8/2024), Disp: , Rfl:     prednisoLONE acetate (PRED FORTE) 1 % ophthalmic suspension, POSTOP INSTILL 1 DROP INTO OPERATIVE EYES FOUR TIMES A DAY FOR 2 WEEKS THEN TWICE DAILY FOR 2 WEEKS (Patient not taking: Reported on 10/8/2024), Disp: , Rfl:     ALLERGIES:  Allergies   Allergen Reactions    Pork-Derived Products - Food Allergy Anaphylaxis    Epoetin (Mando) Other (See Comments)     Other reaction(s): Sweats and fever    Levofloxacin Other (See Comments)    Other Rash     Cat and Dogs       REVIEW OF SYSTEMS:  Pertinent items are noted in HPI.  A comprehensive review of systems was negative.    LABS:  HgA1c:   Lab Results   Component Value Date    HGBA1C 6.5 (H) 08/02/2024     BMP:   Lab Results   Component Value Date    CALCIUM 9.6 09/12/2024    K 4.7 09/12/2024    CO2 24 09/12/2024     09/12/2024    BUN 18 09/12/2024    CREATININE 0.91 09/12/2024         _____________________________________________________  PHYSICAL EXAMINATION:  Vital signs: /76 (BP Location: Right arm, Patient Position: Sitting, Cuff Size: Large)   Pulse 86   Temp (!) 97.3 °F (36.3 °C) (Temporal)   Ht 6' 2\" (1.88 m)   Wt (!) 162 kg (357 lb 6.4 oz)   SpO2 96%   BMI 45.89 kg/m²   General: well developed and well nourished, alert, oriented times 3, and appears comfortable  Psychiatric: Normal  HEENT: Trachea Midline, No torticollis  Cardiovascular: No discernable arrhythmia  Pulmonary: No wheezing or stridor  Abdomen: No rebound or guarding  Extremities: No peripheral edema  Skin: No masses, erythema, lacerations, fluctation, ulcerations  Neurovascular: Sensation Intact to the Median, Ulnar, Radial Nerve, Motor Intact to the Median, " Ulnar, Radial Nerve, and Pulses Intact    MUSCULOSKELETAL EXAMINATION:  Right hand  Digital motion is full    FDS/FDP/FPL/finger extensors intact     negative Finkelstein's  negative Triggering     Tenderness to palpation over the lateral condyle with pain at the lateral elbow with elbow extension and wrist flexion.    Neurovascular:   Two point discrimination is 7 mm in the median and ulnar nerve distribution  positive CT compression test on the right  positive Tinel at CT on the right  positive Phalen on the right  There is no atrophy of the thenar or intrinsic muscles.  5/5 APB strength   5/5 FPL strength   5/5 IO strength   5/5 Abd-DQ strength   negative tinel at the elbow on the right  positive pain to palpation over the ulnar nerve on the right  Wwp    CTS-6:  Numbness in median nerve distribution (3.5): y  Nocturnal numbness (4): y  Thenar atrophy/weakness(5): n  Positive Phalen test (5): y  Loss of 2pt discrimination (4.5): y  Positive Tinel sign (4): y  Total: 21/26 (12)  ____________________  STUDIES REVIEWED:    EMG/NCS: Moderate carpal tunnel syndrome on the right with increased motor latency and decreased motor velocity.  There is also evidence of ulnar nerve decreased sensory velocity was increased sensory latency possibly from the elbow although this image clouded due to Mayco-Sudeep anastomosis.  There is no evidence of radiculopathy.      PROCEDURES PERFORMED:  Procedures   No procedures performed

## 2024-10-09 DIAGNOSIS — G56.01 CARPAL TUNNEL SYNDROME OF RIGHT WRIST: Primary | ICD-10-CM

## 2024-10-09 NOTE — TELEPHONE ENCOUNTER
Caller: [facility]    Doctor: German    Reason for call: [facility] had gotten a referral for PT for patient but she was released from PT. They need a referral for OT instead. Please add script and fax to # [phone].  Thank you.    Call back#: [phone]

## 2024-11-04 DIAGNOSIS — M54.16 LUMBAR RADICULOPATHY: ICD-10-CM

## 2024-11-04 DIAGNOSIS — M54.12 CERVICAL RADICULOPATHY: ICD-10-CM

## 2024-11-04 RX ORDER — GABAPENTIN 600 MG/1
600 TABLET ORAL 3 TIMES DAILY
Qty: 90 TABLET | Refills: 0 | Status: SHIPPED | OUTPATIENT
Start: 2024-11-04

## 2024-12-04 DIAGNOSIS — M54.16 LUMBAR RADICULOPATHY: ICD-10-CM

## 2024-12-04 DIAGNOSIS — M54.12 CERVICAL RADICULOPATHY: ICD-10-CM

## 2024-12-04 RX ORDER — GABAPENTIN 600 MG/1
600 TABLET ORAL 3 TIMES DAILY
Qty: 90 TABLET | Refills: 0 | Status: SHIPPED | OUTPATIENT
Start: 2024-12-04

## 2025-04-16 ENCOUNTER — APPOINTMENT (OUTPATIENT)
Dept: RADIOLOGY | Facility: CLINIC | Age: 45
End: 2025-04-16
Payer: COMMERCIAL

## 2025-04-16 ENCOUNTER — OFFICE VISIT (OUTPATIENT)
Dept: URGENT CARE | Facility: CLINIC | Age: 45
End: 2025-04-16
Payer: COMMERCIAL

## 2025-04-16 VITALS
BODY MASS INDEX: 41.75 KG/M2 | SYSTOLIC BLOOD PRESSURE: 132 MMHG | HEART RATE: 81 BPM | WEIGHT: 315 LBS | OXYGEN SATURATION: 98 % | RESPIRATION RATE: 19 BRPM | DIASTOLIC BLOOD PRESSURE: 78 MMHG | TEMPERATURE: 97.5 F | HEIGHT: 73 IN

## 2025-04-16 DIAGNOSIS — R05.1 ACUTE COUGH: ICD-10-CM

## 2025-04-16 DIAGNOSIS — J22 LOWER RESPIRATORY INFECTION (E.G., BRONCHITIS, PNEUMONIA, PNEUMONITIS, PULMONITIS): Primary | ICD-10-CM

## 2025-04-16 PROCEDURE — G0383 LEV 4 HOSP TYPE B ED VISIT: HCPCS

## 2025-04-16 PROCEDURE — S9083 URGENT CARE CENTER GLOBAL: HCPCS

## 2025-04-16 PROCEDURE — 71046 X-RAY EXAM CHEST 2 VIEWS: CPT

## 2025-04-16 RX ORDER — AZITHROMYCIN 250 MG/1
TABLET, FILM COATED ORAL
Qty: 6 TABLET | Refills: 0 | Status: SHIPPED | OUTPATIENT
Start: 2025-04-16 | End: 2025-04-20

## 2025-04-16 RX ORDER — PREDNISONE 20 MG/1
40 TABLET ORAL DAILY
Qty: 10 TABLET | Refills: 0 | Status: SHIPPED | OUTPATIENT
Start: 2025-04-16 | End: 2025-04-21

## 2025-04-16 RX ORDER — ALBUTEROL SULFATE 90 UG/1
2 INHALANT RESPIRATORY (INHALATION) EVERY 6 HOURS PRN
Qty: 8.5 G | Refills: 0 | Status: SHIPPED | OUTPATIENT
Start: 2025-04-16

## 2025-04-16 NOTE — PROGRESS NOTES
"  [facility]        NAME: Domenic Albert is a 44 y.o. male  : 1980    MRN: [id number]  DATE: 2025  TIME: 1:27 PM    Assessment and Plan   Lower respiratory infection (e.g., bronchitis, pneumonia, pneumonitis, pulmonitis) [J22]  1. Lower respiratory infection (e.g., bronchitis, pneumonia, pneumonitis, pulmonitis)  azithromycin (ZITHROMAX) 250 mg tablet    predniSONE 20 mg tablet    albuterol (ProAir HFA) 90 mcg/act inhaler      2. Acute cough  XR chest pa and lateral        Preliminary xray read by myself.  Slight opacities at upper lungs bilaterally.  Patient has had pneumonia in past and states he has \"bad lungs\" so unsure if this is acute or chronic finding since no CXR to compare to. Pending radiologist final read.       Due to ongoing symptoms that have now worsened the past couple of days, will treat for possible bacterial infection.  Patient stated his DM is well-controlled currently so we will do steroids as cautioned.    Patient Instructions     Take antibiotic as prescribed  Use inhaler as needed  Take course of steroids as prescribed. Be sure to take with food! Recommended to take in the morning, as taking this medication later in the day may cause sleep disturbance (keeping you awake). If diabetic, be sure to monitor your blood glucose levels while on this medication and notify PCP if levels become too elevated.  Avoid taking ibuprofen containing products while on steroid therapy.    Follow up with PCP in 3-5 days.  Proceed to  ER if symptoms worsen.    If tests are performed, our office will contact you with results only if changes need to made to the care plan discussed with you at the visit. [facility]    Chief Complaint     Chief Complaint   Patient presents with    Cough     Pt c/o cough x6 weeks. Pt using inhaler without relief. Pt currently taking mucinex dm and still not helping . Pt is bringing up mucus when coughing .  "         History of Present Illness       Cough  This is a new problem. Episode onset: 6 weeks. The cough is Productive of sputum. Pertinent negatives include no chest pain, chills, ear pain, eye redness, fever, headaches, postnasal drip, rhinorrhea, sore throat, shortness of breath or wheezing. Treatments tried: inhaler, Mucinex DM.   The cough has become worse the past 2 days.  He denies any current shortness of breath or wheezing.    Review of Systems   Review of Systems   Constitutional:  Negative for chills, fatigue and fever.   HENT:  Negative for congestion, ear pain, postnasal drip, rhinorrhea, sinus pressure, sneezing, sore throat and tinnitus.    Eyes:  Negative for photophobia, redness and itching.   Respiratory:  Positive for cough (productive). Negative for chest tightness, shortness of breath and wheezing.    Cardiovascular:  Negative for chest pain.   Skin:  Negative for color change and pallor.   Neurological:  Negative for dizziness, light-headedness and headaches.   Psychiatric/Behavioral:  Negative for confusion.          Current Medications       Current Outpatient Medications:     acetaminophen (TYLENOL) 325 mg tablet, Take 650 mg by mouth every 6 (six) hours as needed for mild pain, Disp: , Rfl:     albuterol (2.5 mg/3 mL) 0.083 % nebulizer solution, USE 1 VIAL IN NEBULIZER EVERY 4 HOURS AS NEEDED FOR WHEEZING, Disp: , Rfl:     albuterol (ProAir HFA) 90 mcg/act inhaler, Inhale 2 puffs every 6 (six) hours as needed for wheezing or shortness of breath, Disp: 8.5 g, Rfl: 0    albuterol (PROVENTIL HFA,VENTOLIN HFA) 90 mcg/act inhaler, Inhale 2 puffs every 4 (four) hours as needed PRN, Disp: , Rfl:     azithromycin (ZITHROMAX) 250 mg tablet, Take 2 tablets today then 1 tablet daily x 4 days, Disp: 6 tablet, Rfl: 0    brimonidine-timolol (COMBIGAN) 0.2-0.5 %, , Disp: , Rfl:     citalopram (CeleXA) 10 mg tablet, Take 10 mg by mouth every morning, Disp: , Rfl:     EPINEPHrine (EPIPEN) 0.3 mg/0.3 mL  SOAJ, Inject 0.3 mg into a muscle, Disp: , Rfl:     fluticasone (FLONASE) 50 mcg/act nasal spray, INHALE 2 SPRAYS INTO EACH NOSTRIL TWICE DAILY FOR NOSE INFLAMMATION., Disp: , Rfl:     ibuprofen (MOTRIN) 200 mg tablet, Take by mouth every 6 (six) hours as needed for mild pain, Disp: , Rfl:     levothyroxine 150 mcg tablet, TAKE 1 TABLET BY MOUTH ONCE DAILY BEFORE A MEAL EVERY MORNING-TAKE ON EMPTY STOMACH-DRINK PLENTY OF WATER., Disp: , Rfl:     lisinopril (ZESTRIL) 10 mg tablet, Take 10 mg by mouth daily, Disp: , Rfl:     metFORMIN (GLUCOPHAGE) 1000 MG tablet, Take 1,000 mg by mouth 2 (two) times a day, Disp: , Rfl:     predniSONE 20 mg tablet, Take 2 tablets (40 mg total) by mouth daily for 5 days, Disp: 10 tablet, Rfl: 0    ALPRAZolam (XANAX) 0.5 mg tablet, Take 0.5 mg by mouth Three times daily as needed (Patient not taking: Reported on 4/16/2025), Disp: , Rfl:     gabapentin (NEURONTIN) 600 MG tablet, TAKE 1 TABLET BY MOUTH THREE TIMES DAILY (Patient not taking: Reported on 4/16/2025), Disp: 90 tablet, Rfl: 0    ketorolac (ACULAR) 0.5 % ophthalmic solution, START 3 DAYS PREOP INSTILL 1 DROP INTO OPERATIVE EYES FOUR TIMES DAILY CONTINUE FOR 4 WEEKS POSTOP (Patient not taking: Reported on 4/16/2025), Disp: , Rfl:     meloxicam (Mobic) 7.5 mg tablet, Take 1 tablet (7.5 mg total) by mouth daily (Patient not taking: Reported on 4/16/2025), Disp: 14 tablet, Rfl: 0    Naproxen Sodium (Aleve) 220 MG CAPS, Take by mouth (Patient not taking: Reported on 4/16/2025), Disp: , Rfl:     ofloxacin (OCUFLOX) 0.3 % ophthalmic solution, START 3 DAYS PREOP INSTILL 1 DROP INTO OPERATIVE EYES FOUR TIMES DAILY FOR 1 WEEK POSTOP (Patient not taking: Reported on 4/16/2025), Disp: , Rfl:     prednisoLONE acetate (PRED FORTE) 1 % ophthalmic suspension, POSTOP INSTILL 1 DROP INTO OPERATIVE EYES FOUR TIMES A DAY FOR 2 WEEKS THEN TWICE DAILY FOR 2 WEEKS (Patient not taking: Reported on 4/16/2025), Disp: , Rfl:     Current Allergies  "    Allergies as of 04/16/2025 - Reviewed 04/16/2025   Allergen Reaction Noted    Pork-derived products - food allergy Anaphylaxis 10/08/2024    Carbamazepine Other (See Comments) 04/16/2025    Epoetin (jefry) Other (See Comments) 04/20/2020    Levofloxacin Other (See Comments) 04/20/2020    Other Rash 10/08/2024            The following portions of the patient's history were reviewed and updated as appropriate: allergies, current medications, past family history, past medical history, past social history, past surgical history and problem list.     Past Medical History:   Diagnosis Date    Allergic     Anxiety     Diabetes mellitus (HCC)     Disease of thyroid gland     Hypertension        Past Surgical History:   Procedure Laterality Date    APPENDECTOMY  2008    EYE SURGERY Bilateral     IR SPINE AND PAIN PROCEDURE  04/23/2024    NASAL SEPTUM SURGERY  2008    TOENAIL EXCISION  2000    TONSILLECTOMY  1994       History reviewed. No pertinent family history.      Medications have been verified.        Objective   /78   Pulse 81   Temp 97.5 °F (36.4 °C)   Resp 19   Ht 6' 1\" (1.854 m)   Wt (!) 154 kg (340 lb)   SpO2 98%   BMI 44.86 kg/m²        Physical Exam     Physical Exam  Constitutional:       Appearance: Normal appearance.   HENT:      Head: Normocephalic.      Right Ear: Tympanic membrane and external ear normal.      Left Ear: Tympanic membrane and external ear normal.      Mouth/Throat:      Mouth: Mucous membranes are moist.      Pharynx: Oropharynx is clear.   Eyes:      Extraocular Movements: Extraocular movements intact.      Conjunctiva/sclera: Conjunctivae normal.      Pupils: Pupils are equal, round, and reactive to light.   Cardiovascular:      Rate and Rhythm: Normal rate and regular rhythm.      Pulses: Normal pulses.      Heart sounds: Normal heart sounds.   Pulmonary:      Effort: Pulmonary effort is normal. No respiratory distress.      Breath sounds: Normal breath sounds. No " stridor. No wheezing, rhonchi or rales.   Musculoskeletal:      Cervical back: No tenderness.   Lymphadenopathy:      Cervical: No cervical adenopathy.   Skin:     General: Skin is warm and dry.   Neurological:      General: No focal deficit present.      Mental Status: He is alert and oriented to person, place, and time. Mental status is at baseline.   Psychiatric:         Mood and Affect: Mood normal.         Behavior: Behavior normal.         Thought Content: Thought content normal.         Judgment: Judgment normal.

## 2025-04-16 NOTE — PATIENT INSTRUCTIONS
Take antibiotic as prescribed  Use inhaler as needed  Take course of steroids as prescribed. Be sure to take with food! Recommended to take in the morning, as taking this medication later in the day may cause sleep disturbance (keeping you awake). If diabetic, be sure to monitor your blood glucose levels while on this medication and notify PCP if levels become too elevated.  Avoid taking ibuprofen containing products while on steroid therapy.    Follow up with PCP in 3-5 days.  Proceed to  ER if symptoms worsen.    If tests are performed, our office will contact you with results only if changes need to made to the care plan discussed with you at the visit. You can review your full results on [facility].

## 2025-05-05 DIAGNOSIS — J22 LOWER RESPIRATORY INFECTION (E.G., BRONCHITIS, PNEUMONIA, PNEUMONITIS, PULMONITIS): ICD-10-CM

## 2025-05-05 RX ORDER — ALBUTEROL SULFATE 90 UG/1
INHALANT RESPIRATORY (INHALATION)
Qty: 9 G | Refills: 0 | OUTPATIENT
Start: 2025-05-05